# Patient Record
Sex: FEMALE | Race: BLACK OR AFRICAN AMERICAN | Employment: OTHER | URBAN - METROPOLITAN AREA
[De-identification: names, ages, dates, MRNs, and addresses within clinical notes are randomized per-mention and may not be internally consistent; named-entity substitution may affect disease eponyms.]

---

## 2017-02-20 ENCOUNTER — HOSPITAL ENCOUNTER (OUTPATIENT)
Dept: OTHER | Age: 67
Discharge: HOME | End: 2017-02-20
Payer: MEDICARE

## 2017-02-20 ENCOUNTER — HOSPITAL ENCOUNTER (OUTPATIENT)
Dept: LAB | Age: 67
Discharge: HOME | End: 2017-02-20
Payer: MEDICARE

## 2017-02-20 ENCOUNTER — HOSPITAL ENCOUNTER (OUTPATIENT)
Dept: PREADMISSION TESTING | Age: 67
Discharge: HOME | End: 2017-02-20
Payer: MEDICARE

## 2017-02-20 DIAGNOSIS — Z01.818 PREOP EXAMINATION: ICD-10-CM

## 2017-02-20 LAB
ABO + RH BLD: NORMAL
ANION GAP BLD CALC-SCNC: 10 MMOL/L (ref 3–18)
ATRIAL RATE: 59 BPM
BLOOD BANK CMNT PATIENT-IMP: NORMAL
BLOOD GROUP ANTIBODIES SERPL: NORMAL
BUN SERPL-MCNC: 15 MG/DL (ref 7–18)
BUN/CREAT SERPL: 17 (ref 12–20)
CALCIUM SERPL-MCNC: 8.7 MG/DL (ref 8.5–10.1)
CALCULATED P AXIS, ECG09: 59 DEGREES
CALCULATED R AXIS, ECG10: 62 DEGREES
CALCULATED T AXIS, ECG11: 56 DEGREES
CHLORIDE SERPL-SCNC: 101 MMOL/L (ref 100–108)
CO2 SERPL-SCNC: 28 MMOL/L (ref 21–32)
CREAT SERPL-MCNC: 0.88 MG/DL (ref 0.6–1.3)
DIAGNOSIS, 93000: NORMAL
ERYTHROCYTE [DISTWIDTH] IN BLOOD BY AUTOMATED COUNT: 14.4 % (ref 11.6–14.5)
GLUCOSE SERPL-MCNC: 118 MG/DL (ref 74–99)
HCG SERPL QL: NEGATIVE
HCT VFR BLD AUTO: 38 % (ref 35–45)
HGB BLD-MCNC: 12.4 G/DL (ref 12–16)
MCH RBC QN AUTO: 28.9 PG (ref 24–34)
MCHC RBC AUTO-ENTMCNC: 32.6 G/DL (ref 31–37)
MCV RBC AUTO: 88.6 FL (ref 74–97)
P-R INTERVAL, ECG05: 164 MS
PLATELET # BLD AUTO: 294 K/UL (ref 135–420)
PMV BLD AUTO: 9.9 FL (ref 9.2–11.8)
POTASSIUM SERPL-SCNC: 3.7 MMOL/L (ref 3.5–5.5)
Q-T INTERVAL, ECG07: 396 MS
QRS DURATION, ECG06: 90 MS
QTC CALCULATION (BEZET), ECG08: 392 MS
RBC # BLD AUTO: 4.29 M/UL (ref 4.2–5.3)
SODIUM SERPL-SCNC: 139 MMOL/L (ref 136–145)
SPECIMEN EXP DATE BLD: NORMAL
VENTRICULAR RATE, ECG03: 59 BPM
WBC # BLD AUTO: 7 K/UL (ref 4.6–13.2)

## 2017-02-20 PROCEDURE — 36415 COLL VENOUS BLD VENIPUNCTURE: CPT | Performed by: OBSTETRICS & GYNECOLOGY

## 2017-02-20 PROCEDURE — 86900 BLOOD TYPING SEROLOGIC ABO: CPT | Performed by: OBSTETRICS & GYNECOLOGY

## 2017-02-20 PROCEDURE — 84703 CHORIONIC GONADOTROPIN ASSAY: CPT | Performed by: OBSTETRICS & GYNECOLOGY

## 2017-02-20 PROCEDURE — 71020 XR CHEST PA LAT: CPT

## 2017-02-20 PROCEDURE — 80048 BASIC METABOLIC PNL TOTAL CA: CPT | Performed by: OBSTETRICS & GYNECOLOGY

## 2017-02-20 PROCEDURE — 93005 ELECTROCARDIOGRAM TRACING: CPT

## 2017-02-20 PROCEDURE — 86304 IMMUNOASSAY TUMOR CA 125: CPT | Performed by: OBSTETRICS & GYNECOLOGY

## 2017-02-20 PROCEDURE — 85027 COMPLETE CBC AUTOMATED: CPT | Performed by: OBSTETRICS & GYNECOLOGY

## 2017-02-20 RX ORDER — METFORMIN HYDROCHLORIDE 500 MG/1
500 TABLET ORAL 2 TIMES DAILY WITH MEALS
COMMUNITY

## 2017-02-20 RX ORDER — METFORMIN HYDROCHLORIDE 500 MG/1
1500 TABLET ORAL
COMMUNITY

## 2017-02-20 NOTE — PERIOP NOTES
PAT - SURGICAL PRE-ADMISSION INSTRUCTIONS    NAME:  Jose Alberto Hendrix                                                          TODAY'S DATE:  2/20/2017    SURGERY DATE:  2/28/2017                                  SURGERY ARRIVAL TIME:  0830    1. Do NOT eat or drink anything, including candy or gum, after MIDNIGHT on [id number] , unless you have specific instructions from your Surgeon or Anesthesia Provider to do so. 2. No smoking on the day of surgery. 3. No alcohol 24 hours prior to the day of surgery. 4. No recreational drugs for one week prior to the day of surgery. 5. Leave all valuables, including money/purse, at home. 6. Remove all jewelry, nail polish, makeup (including mascara); no lotions, powders, deodorant, or perfume/cologne/after shave. 7. Glasses/Contact lenses and Dentures may be worn to the hospital.  They will be removed prior to surgery. 8. Call your doctor if symptoms of a cold or illness develop within 24 ours prior to surgery. 9. AN ADULT MUST DRIVE YOU HOME AFTER OUTPATIENT SURGERY. 10. If you are having an OUTPATIENT procedure, please make arrangements for a responsible adult to be with you for 24 hours after your surgery. 11. If you are admitted to the hospital, you will be assigned to a bed after surgery is complete. Normally a family member will not be able to see you until you are in your assigned bed. 15. Family is encouraged to accompany you to the hospital.  We ask visitors in the treatment area to be limited to ONE person at a time to ensure patient privacy. EXCEPTIONS WILL BE MADE AS NEEDED. 15. Children under 12 are discouraged from entering the treatment area and need to be supervised by an adult when in the waiting room. Special Instructions:    Bring list of CURRENT medications . , Take these medications the morning of surgery with a sip of water:   BLOOD PRESSURE/ HEART MEDS, HOLD oral diabetic medication on the MORNING OF surgery. , Complete bowel prep per MD instructions., STOP anticoagulants AT LEAST 1 WEEK PRIOR to your surgery or, follow other MD instructions:  BLOOD THINNERS / NSAIDS / ASPIRIN    Patient Prep:    use CHG solution    These surgical instructions were reviewed with PATIENT during the VISIT (visit/phone call). A printed copy of the instructions was provided to PATIENT. Directions: On the morning of surgery, please go to the [address] [place] [address]. Enter the building from the [facility] entrance, 1st floor (next to the Emergency Room entrance). Take the elevator to the 2nd floor. Sign in at the Registration Desk.     If you have any questions and/or concerns, please do not hesitate to call:  (Prior to the day of surgery)  [facility] unit:  [phone]  (Day of surgery)  [address] unit:  [phone]

## 2017-02-21 LAB — CANCER AG125 SERPL-ACNC: 5.2 U/ML (ref 0–38.1)

## 2017-02-23 NOTE — H&P
Dear Dr. Delaney Proffer:    Your patient, Armand Sarabia, was seen at your request in gynecologic oncology consultation today for evaluation and management of an adnexal mass. Chief Complaint:  Adnexal Mass    History of Present Illness:  Armand Sarabia presented to [facility] with c/o several episodes of vaginal spotting on 10/21/16. A pelvic US was performed and revealed an anteverted uterus that measures 6.8 cm. The endometrial stripe measures 1.2 cm. There is no fluid within the endometrial canal. The cervix contains several nabothian cysts. The right ovary measures 13.2 cm, no normal ovarian tissue is identified. A multiloculated cyst measures 12.5 cm. Several septations appear to have vascularity on color Doppler. The left ovary is not visualized. There is no fluid in the cul de sac. Today, the patient reports postmenopausal bleeding beginning in October. She has symptoms of pelvic pressure. Most recent Pap smear was negative. The patient presents to her visit today alone. Past Medical History:  Anxiety  Glaucoma  Hypertension - well controlled on medication  Diabetes  Cataracts  Surgeries:        Health Maintenance:  Never smoker  Mammogram - Screening (bilateral) on 2016  Pap Smear on   Pelvic Exam on 2017    Review of Systems:  Constitutional: No weight loss, no fever, no chills, no night sweats, energy level good. Cardiovascular:no chest pain, no palpitations, no syncope, no upper extremity edema, no lower extremity edema, no calf discomfort. Eyes: no diplopia, no transient or permanent loss of vision, no scotomata, cataracts. ENT/Mouth: no tinnitus, normal hearing, no epistaxis, no hoarseness, no oral ulcers, no gingival bleeding, no sore throat, no postnasal drip, no mouth pain, no sinus pain, no dysphagia. Respiratory:  no cough, no hemoptysis, no dyspnea, no pleurisy, no wheezing.   Breast:  no breast mass, no pain, no nipple discharge, no change in size, no change in shape. Gastrointestinal:  no abdominal pain, no nausea, no vomiting, no constipation, no hematochezia, no jaundice, no abdominal cramping, no hematemesis, no diarrhea, no melena, no dyspepsia, no dysphagia. Female urinary:  no dysuria, no hematuria, no nocturia, no urinary incontinence. Gynecological:  no vaginal discharge, no suprapubic pain, no abnormal vaginal bleeding. Musculoskeletal: no muscle pain, no swollen joints, no joint redness, no bone pain, no spine tenderness. Neurologic:  no confusion, no seizures, no syncope, no tremor, no speech change, no headache, no hiccups, no abnormal gait, no weakness, no sensory changes. Psychiatric:  no depression, no anxiety, concentration normal.  Endocrine:  no polyuria, no polydipsia, no thyroid disease symptoms, no hot flashes. Hematologic:  no epistaxis, no gingival bleeding, no petechiae, no ecchymosis. Lymphatic:  no lymphadenopathy, no lymphedema. Ob/Gyn History:  Menses Details: Last Period: 2007; ; Pregnancy Details: Para: 3; #Living Children: 3; ; Menopause Information: Postmenopausal; ; OB/GYN Medication Hx: IUD: No; Other Contraceptive Hx: No;    Family History:Father: Unknown History; Mother: Unknown History    Social History:  Marital Status:    Employment Status:     Vital Signs:  Vital signs:  HT: 64 in, WT: 165.5 lb,  Blood pressure: 130/70, Pulse: 78, Temperature: 98 F, Respirations: 16, O2 sat: 95%, Pain Scale: 0, Height: 64 in, Weight: 165.5 lb, BSA: 1.84, BMI: 28.41 kg/m2, BMI: 28.41 kg/m2    Physical Exam:  Constitutional:  normal appearance, no acute distress. Eyes:  conjunctivae normal, eyelids normal, PERRLA, anicteric. Ears: nose, throat:  external ears and nose normal, hearing grossly normal, oropharynx unremarkable. Neck:  supple, no masses. Respiratory:  breathing comfortably, lungs clear to auscultation and percussion. Cardiovascular:  normal heart sounds, heart rhythm regular, no peripheral edema.   Left breast:  normal appearance, no breast mass, no tenderness. Right breast:  normal appearance, no breast mass, no tenderness. Abdomen: Suprapubic transverse scar; no ascites, no masses, no tenderness, no hepatomegaly, no splenomegaly. Pelvic: See procedure note. external genitalia unremarkable. Bimanual exam reveals a 10-12cm mass in the right pelvis; reasonable mobility of the uterus. No nodularity. Accompanied by:  Female staff member. Rectal:  Exam is limited to patient cooperation. Lymph nodes:  no cervical adenopathy, no axillary adenopathy, no supraclavicular adenopathy. Musculoskeletal:  normal muscle strength. Neurologic:  no focal motor deficit, normal gait, no abnormal mental status. Psychiatric:  oriented to person, time and place, mood and affect appropriate to situation, appropriate judgement and insight, memory intact. In-House Procedure:  Endometrial biopsy w/wo endocervical biopsy; w/wo cervical dilation, any method (separate procedure)  52578    Uterus sounds to 7cm; adequate sample obtained. Laboratory:    Current Medications:  Amlodipine Oral  Glimepiride Oral  Lisinopril Oral  Metformin Oral  Metoprolol Oral (Tartrate)  Simvastatin Oral      Allergies & Adverse Reactions:  No known medication allergies    Problem List:    Assessment & Plan:  Postmenopausal bleeding  13cm complex right ovarian mass    I discussed the current situation in detail with Ms. Jayro Haines. Endometrial biopsy was performed today. CT of the abdomen and pelvis will be scheduled and the patient will contact the office for all results. I recommended that we make plans to go to the operating room for minimally invasive robotic TLH-BSO, possible staging of either right ovarian malignancy or endometrial malignancy. The details, risks, and postoperative recovery were reviewed as well as the possible need for additional intervention depending upon the final pathology report.   CA-125 will be included with her pre-op labs. Her questions were answered to her apparent satisfaction and we will proceed on 2/28/17 at \[facility]". Thank you for the opportunity to share in the care of this pleasant patient. Sincerely,    New Orders:  01/16/2017, RTC MD, Perform Date: Prior to Next Visit, Instructions: Post op 2 weeks after 2/28/17 surgery  01/16/2017, CT abdomen/pelvis w/ contrast, Perform Date: STAT, Instructions: DX: Adnexal mass R19.09 / optima Joycelyn Maria Luisa [id number]*IZX exp 4/19/2017 / order faxed to [facility] @ [facility] to call patient and schedule for ASAP at [facility] /     Other Physicians:  Kt Banerjee [facility])    Adrian Martines M.D. Send copy of note to:  Dillon Steve MD (Referring)  Kt Banerjee    Documentation provided by Sergio King, for Dr. Phillip Barbosa, 01/16/17.     Electronically signed by Adrian Martines MD 01/30/2017 16:16 EST

## 2017-02-27 ENCOUNTER — ANESTHESIA EVENT (OUTPATIENT)
Dept: SURGERY | Age: 67
End: 2017-02-27
Payer: MEDICARE

## 2017-02-28 ENCOUNTER — HOSPITAL ENCOUNTER (OUTPATIENT)
Age: 67
Discharge: HOME | End: 2017-03-01
Attending: OBSTETRICS & GYNECOLOGY | Admitting: OBSTETRICS & GYNECOLOGY
Payer: MEDICARE

## 2017-02-28 ENCOUNTER — ANESTHESIA (OUTPATIENT)
Dept: SURGERY | Age: 67
End: 2017-02-28
Payer: MEDICARE

## 2017-02-28 PROBLEM — N83.209 OVARIAN CYST: Status: ACTIVE | Noted: 2017-02-28

## 2017-02-28 LAB
BASOPHILS # BLD AUTO: 0 K/UL (ref 0–0.06)
BASOPHILS # BLD: 0 % (ref 0–2)
DIFFERENTIAL METHOD BLD: ABNORMAL
EOSINOPHIL # BLD: 0 K/UL (ref 0–0.4)
EOSINOPHIL NFR BLD: 0 % (ref 0–5)
ERYTHROCYTE [DISTWIDTH] IN BLOOD BY AUTOMATED COUNT: 13.7 % (ref 11.6–14.5)
GLUCOSE BLD STRIP.AUTO-MCNC: 137 MG/DL (ref 70–110)
GLUCOSE BLD STRIP.AUTO-MCNC: 149 MG/DL (ref 70–110)
GLUCOSE BLD STRIP.AUTO-MCNC: 151 MG/DL (ref 70–110)
HCT VFR BLD AUTO: 32.8 % (ref 35–45)
HGB BLD-MCNC: 10.7 G/DL (ref 12–16)
LYMPHOCYTES # BLD AUTO: 17 % (ref 21–52)
LYMPHOCYTES # BLD: 1 K/UL (ref 0.9–3.6)
MCH RBC QN AUTO: 28.4 PG (ref 24–34)
MCHC RBC AUTO-ENTMCNC: 32.6 G/DL (ref 31–37)
MCV RBC AUTO: 87 FL (ref 74–97)
MONOCYTES # BLD: 0.4 K/UL (ref 0.05–1.2)
MONOCYTES NFR BLD AUTO: 7 % (ref 3–10)
NEUTS SEG # BLD: 4.6 K/UL (ref 1.8–8)
NEUTS SEG NFR BLD AUTO: 76 % (ref 40–73)
PLATELET # BLD AUTO: 176 K/UL (ref 135–420)
PMV BLD AUTO: 9.6 FL (ref 9.2–11.8)
RBC # BLD AUTO: 3.77 M/UL (ref 4.2–5.3)
WBC # BLD AUTO: 6 K/UL (ref 4.6–13.2)

## 2017-02-28 PROCEDURE — 74011250636 HC RX REV CODE- 250/636: Performed by: NURSE ANESTHETIST, CERTIFIED REGISTERED

## 2017-02-28 PROCEDURE — 74011250636 HC RX REV CODE- 250/636: Performed by: OBSTETRICS & GYNECOLOGY

## 2017-02-28 PROCEDURE — 76010000876 HC OR TIME 2 TO 2.5HR INTENSV - TIER 2: Performed by: OBSTETRICS & GYNECOLOGY

## 2017-02-28 PROCEDURE — 77030011894 HC TY FOL URIMTR BARD -B: Performed by: OBSTETRICS & GYNECOLOGY

## 2017-02-28 PROCEDURE — 96374 THER/PROPH/DIAG INJ IV PUSH: CPT

## 2017-02-28 PROCEDURE — 76060000035 HC ANESTHESIA 2 TO 2.5 HR: Performed by: OBSTETRICS & GYNECOLOGY

## 2017-02-28 PROCEDURE — 77030031139 HC SUT VCRL2 J&J -A: Performed by: OBSTETRICS & GYNECOLOGY

## 2017-02-28 PROCEDURE — 77030008683 HC TU ET CUF COVD -A: Performed by: NURSE ANESTHETIST, CERTIFIED REGISTERED

## 2017-02-28 PROCEDURE — 77030034850: Performed by: OBSTETRICS & GYNECOLOGY

## 2017-02-28 PROCEDURE — 85025 COMPLETE CBC W/AUTO DIFF WBC: CPT | Performed by: OBSTETRICS & GYNECOLOGY

## 2017-02-28 PROCEDURE — 99218 HC RM OBSERVATION: CPT

## 2017-02-28 PROCEDURE — 88332 PATH CONSLTJ SURG EA ADD BLK: CPT | Performed by: OBSTETRICS & GYNECOLOGY

## 2017-02-28 PROCEDURE — 74011000250 HC RX REV CODE- 250: Performed by: OBSTETRICS & GYNECOLOGY

## 2017-02-28 PROCEDURE — 74011250636 HC RX REV CODE- 250/636

## 2017-02-28 PROCEDURE — 77030002933 HC SUT MCRYL J&J -A: Performed by: OBSTETRICS & GYNECOLOGY

## 2017-02-28 PROCEDURE — 96366 THER/PROPH/DIAG IV INF ADDON: CPT

## 2017-02-28 PROCEDURE — 74011250637 HC RX REV CODE- 250/637: Performed by: NURSE ANESTHETIST, CERTIFIED REGISTERED

## 2017-02-28 PROCEDURE — 77030013079 HC BLNKT BAIR HGGR 3M -A: Performed by: NURSE ANESTHETIST, CERTIFIED REGISTERED

## 2017-02-28 PROCEDURE — 77030011640 HC PAD GRND REM COVD -A: Performed by: OBSTETRICS & GYNECOLOGY

## 2017-02-28 PROCEDURE — 77030035043 HC TRCR ENDOSC OPTCL BLDLSS COVD -B: Performed by: OBSTETRICS & GYNECOLOGY

## 2017-02-28 PROCEDURE — 77030008518 HC TBNG INSUF ENDO STRY -B: Performed by: OBSTETRICS & GYNECOLOGY

## 2017-02-28 PROCEDURE — 74011000250 HC RX REV CODE- 250

## 2017-02-28 PROCEDURE — 77030022704 HC SUT VLOC COVD -B: Performed by: OBSTETRICS & GYNECOLOGY

## 2017-02-28 PROCEDURE — 77030010939 HC CLP LIG TELE -B: Performed by: OBSTETRICS & GYNECOLOGY

## 2017-02-28 PROCEDURE — 76210000006 HC OR PH I REC 0.5 TO 1 HR: Performed by: OBSTETRICS & GYNECOLOGY

## 2017-02-28 PROCEDURE — 88331 PATH CONSLTJ SURG 1 BLK 1SPC: CPT | Performed by: OBSTETRICS & GYNECOLOGY

## 2017-02-28 PROCEDURE — 77030032490 HC SLV COMPR SCD KNE COVD -B: Performed by: OBSTETRICS & GYNECOLOGY

## 2017-02-28 PROCEDURE — 77030018842 HC SOL IRR SOD CL 9% BAXT -A: Performed by: OBSTETRICS & GYNECOLOGY

## 2017-02-28 PROCEDURE — 77030027138 HC INCENT SPIROMETER -A

## 2017-02-28 PROCEDURE — C1727 CATH, BAL TIS DIS, NON-VAS: HCPCS | Performed by: OBSTETRICS & GYNECOLOGY

## 2017-02-28 PROCEDURE — 77030018778 HC MANIP UTER VCAR CNMD -B: Performed by: OBSTETRICS & GYNECOLOGY

## 2017-02-28 PROCEDURE — 82962 GLUCOSE BLOOD TEST: CPT

## 2017-02-28 PROCEDURE — 88305 TISSUE EXAM BY PATHOLOGIST: CPT | Performed by: OBSTETRICS & GYNECOLOGY

## 2017-02-28 PROCEDURE — 74011636637 HC RX REV CODE- 636/637: Performed by: NURSE ANESTHETIST, CERTIFIED REGISTERED

## 2017-02-28 PROCEDURE — 77030008477 HC STYL SATN SLP COVD -A: Performed by: NURSE ANESTHETIST, CERTIFIED REGISTERED

## 2017-02-28 PROCEDURE — 36415 COLL VENOUS BLD VENIPUNCTURE: CPT | Performed by: OBSTETRICS & GYNECOLOGY

## 2017-02-28 PROCEDURE — 77030008771 HC TU NG SALEM SUMP -A: Performed by: NURSE ANESTHETIST, CERTIFIED REGISTERED

## 2017-02-28 PROCEDURE — 77030016151 HC PROTCTR LNS DFOG COVD -B: Performed by: OBSTETRICS & GYNECOLOGY

## 2017-02-28 PROCEDURE — 77030035488 HC SEAL UNIV DISP INTU -C: Performed by: OBSTETRICS & GYNECOLOGY

## 2017-02-28 PROCEDURE — 77030035277 HC OBTRTR BLDELSS DISP INTU -B: Performed by: OBSTETRICS & GYNECOLOGY

## 2017-02-28 PROCEDURE — 77030002986 HC SUT PROL J&J -A: Performed by: OBSTETRICS & GYNECOLOGY

## 2017-02-28 PROCEDURE — A9270 NON-COVERED ITEM OR SERVICE: HCPCS | Performed by: NURSE ANESTHETIST, CERTIFIED REGISTERED

## 2017-02-28 PROCEDURE — 77030019908 HC STETH ESOPH SIMS -A: Performed by: NURSE ANESTHETIST, CERTIFIED REGISTERED

## 2017-02-28 PROCEDURE — 74011250637 HC RX REV CODE- 250/637: Performed by: OBSTETRICS & GYNECOLOGY

## 2017-02-28 PROCEDURE — 77030035051: Performed by: OBSTETRICS & GYNECOLOGY

## 2017-02-28 PROCEDURE — 88307 TISSUE EXAM BY PATHOLOGIST: CPT | Performed by: OBSTETRICS & GYNECOLOGY

## 2017-02-28 PROCEDURE — 77030010507 HC ADH SKN DERMBND J&J -B: Performed by: OBSTETRICS & GYNECOLOGY

## 2017-02-28 PROCEDURE — 77030010935 HC CLP LIG ABSRB TELE -B: Performed by: OBSTETRICS & GYNECOLOGY

## 2017-02-28 PROCEDURE — 77030020059 HC SYR ANGI CNTRL MRTM -A: Performed by: OBSTETRICS & GYNECOLOGY

## 2017-02-28 RX ORDER — NEOSTIGMINE METHYLSULFATE 5 MG/5 ML
SYRINGE (ML) INTRAVENOUS AS NEEDED
Status: DISCONTINUED | OUTPATIENT
Start: 2017-02-28 | End: 2017-02-28 | Stop reason: HOSPADM

## 2017-02-28 RX ORDER — ENOXAPARIN SODIUM 100 MG/ML
40 INJECTION SUBCUTANEOUS EVERY 24 HOURS
Status: DISCONTINUED | OUTPATIENT
Start: 2017-02-28 | End: 2017-03-01 | Stop reason: HOSPADM

## 2017-02-28 RX ORDER — METOPROLOL TARTRATE 50 MG/1
TABLET ORAL 2 TIMES DAILY
COMMUNITY

## 2017-02-28 RX ORDER — DIPHENHYDRAMINE HCL 25 MG
25 CAPSULE ORAL
Status: DISCONTINUED | OUTPATIENT
Start: 2017-02-28 | End: 2017-03-01 | Stop reason: HOSPADM

## 2017-02-28 RX ORDER — DEXTROSE 50 % IN WATER (D50W) INTRAVENOUS SYRINGE
25-50 AS NEEDED
Status: DISCONTINUED | OUTPATIENT
Start: 2017-02-28 | End: 2017-03-01 | Stop reason: HOSPADM

## 2017-02-28 RX ORDER — ZOLPIDEM TARTRATE 5 MG/1
5 TABLET ORAL
Status: DISCONTINUED | OUTPATIENT
Start: 2017-02-28 | End: 2017-03-01 | Stop reason: HOSPADM

## 2017-02-28 RX ORDER — ROCURONIUM BROMIDE 10 MG/ML
INJECTION, SOLUTION INTRAVENOUS AS NEEDED
Status: DISCONTINUED | OUTPATIENT
Start: 2017-02-28 | End: 2017-02-28 | Stop reason: HOSPADM

## 2017-02-28 RX ORDER — NALOXONE HYDROCHLORIDE 0.4 MG/ML
0.4 INJECTION, SOLUTION INTRAMUSCULAR; INTRAVENOUS; SUBCUTANEOUS AS NEEDED
Status: DISCONTINUED | OUTPATIENT
Start: 2017-02-28 | End: 2017-03-01 | Stop reason: HOSPADM

## 2017-02-28 RX ORDER — SIMVASTATIN 40 MG/1
40 TABLET, FILM COATED ORAL
COMMUNITY

## 2017-02-28 RX ORDER — GLYCOPYRROLATE 0.2 MG/ML
INJECTION INTRAMUSCULAR; INTRAVENOUS AS NEEDED
Status: DISCONTINUED | OUTPATIENT
Start: 2017-02-28 | End: 2017-02-28 | Stop reason: HOSPADM

## 2017-02-28 RX ORDER — LISINOPRIL 20 MG/1
20 TABLET ORAL DAILY
Status: DISCONTINUED | OUTPATIENT
Start: 2017-03-01 | End: 2017-03-01 | Stop reason: HOSPADM

## 2017-02-28 RX ORDER — BISACODYL 5 MG
5 TABLET, DELAYED RELEASE (ENTERIC COATED) ORAL DAILY PRN
Status: DISCONTINUED | OUTPATIENT
Start: 2017-02-28 | End: 2017-03-01 | Stop reason: HOSPADM

## 2017-02-28 RX ORDER — METOPROLOL TARTRATE 50 MG/1
50 TABLET ORAL 2 TIMES DAILY
Status: DISCONTINUED | OUTPATIENT
Start: 2017-02-28 | End: 2017-03-01 | Stop reason: HOSPADM

## 2017-02-28 RX ORDER — HYDROMORPHONE HYDROCHLORIDE 1 MG/ML
1-2 INJECTION, SOLUTION INTRAMUSCULAR; INTRAVENOUS; SUBCUTANEOUS
Status: CANCELLED | OUTPATIENT
Start: 2017-02-28

## 2017-02-28 RX ORDER — GLIMEPIRIDE 1 MG/1
TABLET ORAL
COMMUNITY

## 2017-02-28 RX ORDER — INSULIN LISPRO 100 [IU]/ML
INJECTION, SOLUTION INTRAVENOUS; SUBCUTANEOUS ONCE
Status: DISCONTINUED | OUTPATIENT
Start: 2017-02-28 | End: 2017-02-28 | Stop reason: HOSPADM

## 2017-02-28 RX ORDER — GUAIFENESIN 100 MG/5ML
81 LIQUID (ML) ORAL DAILY
COMMUNITY

## 2017-02-28 RX ORDER — ACETAMINOPHEN 325 MG/1
650 TABLET ORAL
Status: DISCONTINUED | OUTPATIENT
Start: 2017-02-28 | End: 2017-03-01 | Stop reason: HOSPADM

## 2017-02-28 RX ORDER — ONDANSETRON 2 MG/ML
4 INJECTION INTRAMUSCULAR; INTRAVENOUS
Status: CANCELLED | OUTPATIENT
Start: 2017-02-28

## 2017-02-28 RX ORDER — HYDROMORPHONE HYDROCHLORIDE 2 MG/ML
0.5 INJECTION, SOLUTION INTRAMUSCULAR; INTRAVENOUS; SUBCUTANEOUS
Status: DISCONTINUED | OUTPATIENT
Start: 2017-02-28 | End: 2017-03-01 | Stop reason: HOSPADM

## 2017-02-28 RX ORDER — AMLODIPINE BESYLATE 5 MG/1
5 TABLET ORAL DAILY
COMMUNITY

## 2017-02-28 RX ORDER — INSULIN LISPRO 100 [IU]/ML
INJECTION, SOLUTION INTRAVENOUS; SUBCUTANEOUS ONCE
Status: COMPLETED | OUTPATIENT
Start: 2017-02-28 | End: 2017-02-28

## 2017-02-28 RX ORDER — OXYCODONE AND ACETAMINOPHEN 5; 325 MG/1; MG/1
1-2 TABLET ORAL
Status: CANCELLED | OUTPATIENT
Start: 2017-02-28

## 2017-02-28 RX ORDER — FAMOTIDINE 20 MG/1
20 TABLET, FILM COATED ORAL ONCE
Status: COMPLETED | OUTPATIENT
Start: 2017-02-28 | End: 2017-02-28

## 2017-02-28 RX ORDER — LIDOCAINE HYDROCHLORIDE 20 MG/ML
INJECTION, SOLUTION EPIDURAL; INFILTRATION; INTRACAUDAL; PERINEURAL AS NEEDED
Status: DISCONTINUED | OUTPATIENT
Start: 2017-02-28 | End: 2017-02-28 | Stop reason: HOSPADM

## 2017-02-28 RX ORDER — SODIUM CHLORIDE 0.9 % (FLUSH) 0.9 %
5-10 SYRINGE (ML) INJECTION AS NEEDED
Status: DISCONTINUED | OUTPATIENT
Start: 2017-02-28 | End: 2017-03-01 | Stop reason: HOSPADM

## 2017-02-28 RX ORDER — FENTANYL CITRATE 50 UG/ML
INJECTION, SOLUTION INTRAMUSCULAR; INTRAVENOUS AS NEEDED
Status: DISCONTINUED | OUTPATIENT
Start: 2017-02-28 | End: 2017-02-28 | Stop reason: HOSPADM

## 2017-02-28 RX ORDER — ONDANSETRON 2 MG/ML
INJECTION INTRAMUSCULAR; INTRAVENOUS AS NEEDED
Status: DISCONTINUED | OUTPATIENT
Start: 2017-02-28 | End: 2017-02-28 | Stop reason: HOSPADM

## 2017-02-28 RX ORDER — OXYCODONE AND ACETAMINOPHEN 5; 325 MG/1; MG/1
2 TABLET ORAL
Status: DISCONTINUED | OUTPATIENT
Start: 2017-02-28 | End: 2017-03-01 | Stop reason: HOSPADM

## 2017-02-28 RX ORDER — ENOXAPARIN SODIUM 100 MG/ML
60 INJECTION SUBCUTANEOUS EVERY 24 HOURS
Status: CANCELLED | OUTPATIENT
Start: 2017-02-28

## 2017-02-28 RX ORDER — SODIUM CHLORIDE, SODIUM LACTATE, POTASSIUM CHLORIDE, CALCIUM CHLORIDE 600; 310; 30; 20 MG/100ML; MG/100ML; MG/100ML; MG/100ML
25 INJECTION, SOLUTION INTRAVENOUS CONTINUOUS
Status: DISCONTINUED | OUTPATIENT
Start: 2017-02-28 | End: 2017-02-28 | Stop reason: HOSPADM

## 2017-02-28 RX ORDER — LISINOPRIL AND HYDROCHLOROTHIAZIDE 12.5; 2 MG/1; MG/1
TABLET ORAL DAILY
COMMUNITY

## 2017-02-28 RX ORDER — FENTANYL CITRATE 50 UG/ML
50 INJECTION, SOLUTION INTRAMUSCULAR; INTRAVENOUS AS NEEDED
Status: DISCONTINUED | OUTPATIENT
Start: 2017-02-28 | End: 2017-03-01 | Stop reason: HOSPADM

## 2017-02-28 RX ORDER — CEFOTETAN AND DEXTROSE 2 G/50ML
2 INJECTION, SOLUTION INTRAVENOUS ONCE
Status: COMPLETED | OUTPATIENT
Start: 2017-02-28 | End: 2017-02-28

## 2017-02-28 RX ORDER — SODIUM CHLORIDE, SODIUM LACTATE, POTASSIUM CHLORIDE, CALCIUM CHLORIDE 600; 310; 30; 20 MG/100ML; MG/100ML; MG/100ML; MG/100ML
125 INJECTION, SOLUTION INTRAVENOUS CONTINUOUS
Status: DISCONTINUED | OUTPATIENT
Start: 2017-02-28 | End: 2017-03-01 | Stop reason: HOSPADM

## 2017-02-28 RX ORDER — OXYCODONE AND ACETAMINOPHEN 5; 325 MG/1; MG/1
1 TABLET ORAL
Qty: 30 TAB | Refills: 0 | Status: SHIPPED | OUTPATIENT
Start: 2017-02-28

## 2017-02-28 RX ORDER — ONDANSETRON 2 MG/ML
4 INJECTION INTRAMUSCULAR; INTRAVENOUS
Status: DISCONTINUED | OUTPATIENT
Start: 2017-02-28 | End: 2017-03-01 | Stop reason: HOSPADM

## 2017-02-28 RX ORDER — SODIUM CHLORIDE 0.9 % (FLUSH) 0.9 %
5-10 SYRINGE (ML) INJECTION EVERY 8 HOURS
Status: DISCONTINUED | OUTPATIENT
Start: 2017-02-28 | End: 2017-03-01 | Stop reason: HOSPADM

## 2017-02-28 RX ORDER — METFORMIN HYDROCHLORIDE 500 MG/1
500 TABLET ORAL 2 TIMES DAILY WITH MEALS
Status: DISCONTINUED | OUTPATIENT
Start: 2017-02-28 | End: 2017-03-01 | Stop reason: HOSPADM

## 2017-02-28 RX ORDER — ZOLPIDEM TARTRATE 5 MG/1
5 TABLET ORAL
Status: DISCONTINUED | OUTPATIENT
Start: 2017-02-28 | End: 2017-02-28 | Stop reason: SDUPTHER

## 2017-02-28 RX ORDER — POTASSIUM CHLORIDE AND SODIUM CHLORIDE 450; 150 MG/100ML; MG/100ML
INJECTION, SOLUTION INTRAVENOUS CONTINUOUS
Status: DISCONTINUED | OUTPATIENT
Start: 2017-02-28 | End: 2017-03-01 | Stop reason: HOSPADM

## 2017-02-28 RX ORDER — GLIMEPIRIDE 2 MG/1
1 TABLET ORAL
Status: DISCONTINUED | OUTPATIENT
Start: 2017-03-01 | End: 2017-03-01 | Stop reason: HOSPADM

## 2017-02-28 RX ORDER — AMLODIPINE BESYLATE 2.5 MG/1
5 TABLET ORAL DAILY
Status: DISCONTINUED | OUTPATIENT
Start: 2017-03-01 | End: 2017-03-01 | Stop reason: HOSPADM

## 2017-02-28 RX ORDER — HYDROMORPHONE HYDROCHLORIDE 1 MG/ML
INJECTION, SOLUTION INTRAMUSCULAR; INTRAVENOUS; SUBCUTANEOUS AS NEEDED
Status: DISCONTINUED | OUTPATIENT
Start: 2017-02-28 | End: 2017-02-28 | Stop reason: HOSPADM

## 2017-02-28 RX ORDER — ESMOLOL HYDROCHLORIDE 10 MG/ML
INJECTION INTRAVENOUS AS NEEDED
Status: DISCONTINUED | OUTPATIENT
Start: 2017-02-28 | End: 2017-02-28 | Stop reason: HOSPADM

## 2017-02-28 RX ORDER — HYDROMORPHONE HYDROCHLORIDE 1 MG/ML
1 INJECTION, SOLUTION INTRAMUSCULAR; INTRAVENOUS; SUBCUTANEOUS
Status: DISCONTINUED | OUTPATIENT
Start: 2017-02-28 | End: 2017-03-01 | Stop reason: HOSPADM

## 2017-02-28 RX ORDER — MIDAZOLAM HYDROCHLORIDE 1 MG/ML
INJECTION, SOLUTION INTRAMUSCULAR; INTRAVENOUS AS NEEDED
Status: DISCONTINUED | OUTPATIENT
Start: 2017-02-28 | End: 2017-02-28 | Stop reason: HOSPADM

## 2017-02-28 RX ORDER — BUPIVACAINE HYDROCHLORIDE 5 MG/ML
INJECTION, SOLUTION EPIDURAL; INTRACAUDAL AS NEEDED
Status: DISCONTINUED | OUTPATIENT
Start: 2017-02-28 | End: 2017-02-28 | Stop reason: HOSPADM

## 2017-02-28 RX ORDER — MAGNESIUM SULFATE 100 %
4 CRYSTALS MISCELLANEOUS AS NEEDED
Status: DISCONTINUED | OUTPATIENT
Start: 2017-02-28 | End: 2017-03-01 | Stop reason: HOSPADM

## 2017-02-28 RX ORDER — NALOXONE HYDROCHLORIDE 0.4 MG/ML
0.4 INJECTION, SOLUTION INTRAMUSCULAR; INTRAVENOUS; SUBCUTANEOUS AS NEEDED
Status: DISCONTINUED | OUTPATIENT
Start: 2017-02-28 | End: 2017-02-28 | Stop reason: SDUPTHER

## 2017-02-28 RX ORDER — HYDROCHLOROTHIAZIDE 12.5 MG/1
12.5 CAPSULE ORAL DAILY
Status: DISCONTINUED | OUTPATIENT
Start: 2017-03-01 | End: 2017-03-01 | Stop reason: HOSPADM

## 2017-02-28 RX ORDER — DIPHENHYDRAMINE HYDROCHLORIDE 50 MG/ML
25 INJECTION, SOLUTION INTRAMUSCULAR; INTRAVENOUS
Status: DISCONTINUED | OUTPATIENT
Start: 2017-02-28 | End: 2017-03-01 | Stop reason: HOSPADM

## 2017-02-28 RX ORDER — PROPOFOL 10 MG/ML
INJECTION, EMULSION INTRAVENOUS AS NEEDED
Status: DISCONTINUED | OUTPATIENT
Start: 2017-02-28 | End: 2017-02-28 | Stop reason: HOSPADM

## 2017-02-28 RX ORDER — INSULIN LISPRO 100 [IU]/ML
INJECTION, SOLUTION INTRAVENOUS; SUBCUTANEOUS
Status: DISCONTINUED | OUTPATIENT
Start: 2017-02-28 | End: 2017-03-01 | Stop reason: HOSPADM

## 2017-02-28 RX ORDER — FAMOTIDINE 10 MG/ML
20 INJECTION INTRAVENOUS EVERY 12 HOURS
Status: DISCONTINUED | OUTPATIENT
Start: 2017-02-28 | End: 2017-03-01 | Stop reason: HOSPADM

## 2017-02-28 RX ADMIN — OXYCODONE HYDROCHLORIDE AND ACETAMINOPHEN 1 TABLET: 5; 325 TABLET ORAL at 21:23

## 2017-02-28 RX ADMIN — METFORMIN HYDROCHLORIDE 500 MG: 500 TABLET, FILM COATED ORAL at 17:36

## 2017-02-28 RX ADMIN — FENTANYL CITRATE 50 MCG: 50 INJECTION, SOLUTION INTRAMUSCULAR; INTRAVENOUS at 13:03

## 2017-02-28 RX ADMIN — FENTANYL CITRATE 50 MCG: 50 INJECTION, SOLUTION INTRAMUSCULAR; INTRAVENOUS at 11:43

## 2017-02-28 RX ADMIN — ESMOLOL HYDROCHLORIDE 10 MG: 10 INJECTION INTRAVENOUS at 11:43

## 2017-02-28 RX ADMIN — FAMOTIDINE 20 MG: 10 INJECTION, SOLUTION INTRAVENOUS at 21:24

## 2017-02-28 RX ADMIN — SODIUM CHLORIDE, SODIUM LACTATE, POTASSIUM CHLORIDE, AND CALCIUM CHLORIDE: 600; 310; 30; 20 INJECTION, SOLUTION INTRAVENOUS at 13:03

## 2017-02-28 RX ADMIN — Medication 10 ML: at 21:24

## 2017-02-28 RX ADMIN — LIDOCAINE HYDROCHLORIDE 50 MG: 20 INJECTION, SOLUTION EPIDURAL; INFILTRATION; INTRACAUDAL; PERINEURAL at 11:20

## 2017-02-28 RX ADMIN — MIDAZOLAM HYDROCHLORIDE 2 MG: 1 INJECTION, SOLUTION INTRAMUSCULAR; INTRAVENOUS at 11:16

## 2017-02-28 RX ADMIN — FAMOTIDINE 20 MG: 10 INJECTION, SOLUTION INTRAVENOUS at 15:11

## 2017-02-28 RX ADMIN — PROPOFOL 150 MG: 10 INJECTION, EMULSION INTRAVENOUS at 11:20

## 2017-02-28 RX ADMIN — FENTANYL CITRATE 50 MCG: 50 INJECTION, SOLUTION INTRAMUSCULAR; INTRAVENOUS at 14:05

## 2017-02-28 RX ADMIN — SODIUM CHLORIDE AND POTASSIUM CHLORIDE: 4.5; 1.49 INJECTION, SOLUTION INTRAVENOUS at 16:21

## 2017-02-28 RX ADMIN — CEFOTETAN DISODIUM 2 G: 2 INJECTION, POWDER, FOR SOLUTION INTRAMUSCULAR; INTRAVENOUS at 11:16

## 2017-02-28 RX ADMIN — SODIUM CHLORIDE, SODIUM LACTATE, POTASSIUM CHLORIDE, AND CALCIUM CHLORIDE 25 ML/HR: 600; 310; 30; 20 INJECTION, SOLUTION INTRAVENOUS at 08:30

## 2017-02-28 RX ADMIN — ONDANSETRON 4 MG: 2 INJECTION INTRAMUSCULAR; INTRAVENOUS at 12:45

## 2017-02-28 RX ADMIN — ENOXAPARIN SODIUM 40 MG: 40 INJECTION SUBCUTANEOUS at 15:09

## 2017-02-28 RX ADMIN — GLYCOPYRROLATE 0.2 MG: 0.2 INJECTION INTRAMUSCULAR; INTRAVENOUS at 12:58

## 2017-02-28 RX ADMIN — Medication 2 MG: at 12:58

## 2017-02-28 RX ADMIN — FENTANYL CITRATE 100 MCG: 50 INJECTION, SOLUTION INTRAMUSCULAR; INTRAVENOUS at 11:20

## 2017-02-28 RX ADMIN — HYDROMORPHONE HYDROCHLORIDE 1 MG: 1 INJECTION, SOLUTION INTRAMUSCULAR; INTRAVENOUS; SUBCUTANEOUS at 11:37

## 2017-02-28 RX ADMIN — ROCURONIUM BROMIDE 30 MG: 10 INJECTION, SOLUTION INTRAVENOUS at 11:20

## 2017-02-28 RX ADMIN — INSULIN LISPRO 3 UNITS: 100 INJECTION, SOLUTION INTRAVENOUS; SUBCUTANEOUS at 14:02

## 2017-02-28 RX ADMIN — FENTANYL CITRATE 50 MCG: 50 INJECTION, SOLUTION INTRAMUSCULAR; INTRAVENOUS at 11:42

## 2017-02-28 RX ADMIN — FAMOTIDINE 20 MG: 20 TABLET ORAL at 08:34

## 2017-02-28 RX ADMIN — METOPROLOL TARTRATE 50 MG: 50 TABLET ORAL at 17:36

## 2017-02-28 NOTE — ANESTHESIA POSTPROCEDURE EVALUATION
Post-Anesthesia Evaluation and Assessment    Patient: Gabrielle Martinez MRN: [id number]  SSN: [id number]    YOB: 1950  Age: 73 y.o. Sex: female       Cardiovascular Function/Vital Signs  Visit Vitals    /66    Pulse 79    Temp 36.7 °C (98 °F)    Resp 23    Ht 5' 4\" (1.626 m)    Wt 73.5 kg (162 lb)    SpO2 100%    BMI 27.81 kg/m2       Patient is status post general anesthesia for Procedure(s):  davinci total laparoscopic HYSTERECTOMY, bilateral salpingo oophorectomy, posible staging XI ROBOTIC ASSISTED. Nausea/Vomiting: None    Postoperative hydration reviewed and adequate. Pain:  Pain Scale 1: Numeric (0 - 10) (02/28/17 1359)  Pain Intensity 1: 3 (02/28/17 1359)   Managed    Neurological Status:   Neuro (WDL): Within Defined Limits (02/28/17 1330)   At baseline    Mental Status and Level of Consciousness: Arousable    Pulmonary Status:   O2 Device: Oxygen mask (02/28/17 1344)   Adequate oxygenation and airway patent    Complications related to anesthesia: None    Post-anesthesia assessment completed.  No concerns    Signed By: Sheba Dasilva MD     February 28, 2017

## 2017-02-28 NOTE — ROUTINE PROCESS
Pt blood sugar 137. Held insulin injection, pt on clear liquid diet. Pt getting oral metphormin at 1700. Pt O2 at 98% on 3 liters oxygen nasal cannula. 1745 pt o2 100% on 3 liters oxygen nasal cannula. Rn helped repositition in bed, helped with clear liquid tray. Pt stated she was starting to feel pain, RN explained medications available to her, pt declined at this time, saying she thought she was just hungry and wanted to try her tray. RN instructed her to call for assistance if needed or for pain meds. Kumar catheter in place, SCDs on.

## 2017-02-28 NOTE — H&P (VIEW-ONLY)
Dear Dr. Per Trujillo:    Your patient, Liz Barraza, was seen at your request in gynecologic oncology consultation today for evaluation and management of an adnexal mass. Chief Complaint:  Adnexal Mass    History of Present Illness:  Liz Barraza presented to Dr. Healy at [facility] with c/o several episodes of vaginal spotting on 10/21/16. A pelvic US was performed and revealed an anteverted uterus that measures 6.8 cm. The endometrial stripe measures 1.2 cm. There is no fluid within the endometrial canal. The cervix contains several nabothian cysts. The right ovary measures 13.2 cm, no normal ovarian tissue is identified. A multiloculated cyst measures 12.5 cm. Several septations appear to have vascularity on color Doppler. The left ovary is not visualized. There is no fluid in the cul de sac. Today, the patient reports postmenopausal bleeding beginning in October. She has symptoms of pelvic pressure. Most recent Pap smear was negative. The patient presents to her visit today alone. Past Medical History:  Anxiety  Glaucoma  Hypertension - well controlled on medication  Diabetes  Cataracts  Surgeries:        Health Maintenance:  Never smoker  Mammogram - Screening (bilateral) on 2016  Pap Smear on   Pelvic Exam on 2017    Review of Systems:  Constitutional: No weight loss, no fever, no chills, no night sweats, energy level good. Cardiovascular:no chest pain, no palpitations, no syncope, no upper extremity edema, no lower extremity edema, no calf discomfort. Eyes: no diplopia, no transient or permanent loss of vision, no scotomata, cataracts. ENT/Mouth: no tinnitus, normal hearing, no epistaxis, no hoarseness, no oral ulcers, no gingival bleeding, no sore throat, no postnasal drip, no mouth pain, no sinus pain, no dysphagia. Respiratory:  no cough, no hemoptysis, no dyspnea, no pleurisy, no wheezing.   Breast:  no breast mass, no pain, no nipple discharge, no change in size, no change in shape. Gastrointestinal:  no abdominal pain, no nausea, no vomiting, no constipation, no hematochezia, no jaundice, no abdominal cramping, no hematemesis, no diarrhea, no melena, no dyspepsia, no dysphagia. Female urinary:  no dysuria, no hematuria, no nocturia, no urinary incontinence. Gynecological:  no vaginal discharge, no suprapubic pain, no abnormal vaginal bleeding. Musculoskeletal: no muscle pain, no swollen joints, no joint redness, no bone pain, no spine tenderness. Neurologic:  no confusion, no seizures, no syncope, no tremor, no speech change, no headache, no hiccups, no abnormal gait, no weakness, no sensory changes. Psychiatric:  no depression, no anxiety, concentration normal.  Endocrine:  no polyuria, no polydipsia, no thyroid disease symptoms, no hot flashes. Hematologic:  no epistaxis, no gingival bleeding, no petechiae, no ecchymosis. Lymphatic:  no lymphadenopathy, no lymphedema. Ob/Gyn History:  Menses Details: Last Period: 2007; ; Pregnancy Details: Para: 3; #Living Children: 3; ; Menopause Information: Postmenopausal; ; OB/GYN Medication Hx: IUD: No; Other Contraceptive Hx: No;    Family History:Father: Unknown History; Mother: Unknown History    Social History:  Marital Status:    Employment Status:     Vital Signs:  Vital signs:  HT: 64 in, WT: 165.5 lb,  Blood pressure: 130/70, Pulse: 78, Temperature: 98 F, Respirations: 16, O2 sat: 95%, Pain Scale: 0, Height: 64 in, Weight: 165.5 lb, BSA: 1.84, BMI: 28.41 kg/m2, BMI: 28.41 kg/m2    Physical Exam:  Constitutional:  normal appearance, no acute distress. Eyes:  conjunctivae normal, eyelids normal, PERRLA, anicteric. Ears: nose, throat:  external ears and nose normal, hearing grossly normal, oropharynx unremarkable. Neck:  supple, no masses. Respiratory:  breathing comfortably, lungs clear to auscultation and percussion. Cardiovascular:  normal heart sounds, heart rhythm regular, no peripheral edema.   Left breast:  normal appearance, no breast mass, no tenderness. Right breast:  normal appearance, no breast mass, no tenderness. Abdomen: Suprapubic transverse scar; no ascites, no masses, no tenderness, no hepatomegaly, no splenomegaly. Pelvic: See procedure note. external genitalia unremarkable. Bimanual exam reveals a 10-12cm mass in the right pelvis; reasonable mobility of the uterus. No nodularity. Accompanied by:  Female staff member. Rectal:  Exam is limited to patient cooperation. Lymph nodes:  no cervical adenopathy, no axillary adenopathy, no supraclavicular adenopathy. Musculoskeletal:  normal muscle strength. Neurologic:  no focal motor deficit, normal gait, no abnormal mental status. Psychiatric:  oriented to person, time and place, mood and affect appropriate to situation, appropriate judgement and insight, memory intact. In-House Procedure:  Endometrial biopsy w/wo endocervical biopsy; w/wo cervical dilation, any method (separate procedure)  67794    Uterus sounds to 7cm; adequate sample obtained. Laboratory:    Current Medications:  Amlodipine Oral  Glimepiride Oral  Lisinopril Oral  Metformin Oral  Metoprolol Oral (Tartrate)  Simvastatin Oral      Allergies & Adverse Reactions:  No known medication allergies    Problem List:    Assessment & Plan:  Postmenopausal bleeding  13cm complex right ovarian mass    I discussed the current situation in detail with Ms. Villatoro First. Endometrial biopsy was performed today. CT of the abdomen and pelvis will be scheduled and the patient will contact the office for all results. I recommended that we make plans to go to the operating room for minimally invasive robotic TLH-BSO, possible staging of either right ovarian malignancy or endometrial malignancy. The details, risks, and postoperative recovery were reviewed as well as the possible need for additional intervention depending upon the final pathology report.   CA-125 will be included with her pre-op labs. Her questions were answered to her apparent satisfaction and we will proceed on 2/28/17 at [facility]. Thank you for the opportunity to share in the care of this pleasant patient. Sincerely,    New Orders:  01/16/2017, RTC MD, Perform Date: Prior to Next Visit, Instructions: Post op 2 weeks after 2/28/17 surgery  01/16/2017, CT abdomen/pelvis w/ contrast, Perform Date: STAT, Instructions: DX: Adnexal mass R19.09 / optima Atul Meter [id number]*LYI exp 4/19/2017 / order faxed to [facility] to call patient and schedule for ASAP at [facility] /     Other Physicians:  Inocencio Dyson [facility])    Ameena Welsh M.D. Send copy of note to:  Juan Jose Heredia MD (Referring)  Inocencio Dyson    Documentation provided by Sergio Grewal, for Dr. Janice Baer, 01/16/17.     Electronically signed by Ameena Welsh MD 01/30/2017 16:16 EST

## 2017-02-28 NOTE — OP NOTES
Operative Report    Patient: Donato Dasilva MRN: [id number]  SSN: [id number]    YOB: 1950  Age: 73 y.o. Sex: female         DATE OF OPERATION: 2/28/2017      SURGEON: Aniya Means MD      ASSISTANT: URI Whitney, present and scrubbed to assist with port placement, robotic docking, and uterine manipulation   [place], [place]      PREOPERATIVE DIAGNOSIS:  pelvic mass postmenipausal bleeding      POSTOPERATIVE DIAGNOSIS: 18cm right ovarian mucinous cystadenoma. No evidence of malignancy in the endometrium  . OPERATION PERFORMED:  1. Robotic hysterectomy with bilateral salpingo-oophorectomy (BSO). Specimens:    ID Type Source Tests Collected by Time Destination   1 : UTERUS, CERVIX, BILATERAL TUBES AND OVARIES Frozen Section   Aniya Means MD 2/28/2017 1221 Pathology   2 : [facility] Preservative   Aniya Means MD 2/28/2017 1228 Pathology         INDICATIONS:  Donato Dasilva is a 73 y.o. old female admitted for surgery with a history of: There are no active problems to display for this patient. .  Recommendations were made for a definitive surgical therapy. The patient was advised of potential risks and complications and written  consent was obtained. ANESTHESIA: General.    COMPLICATIONS: none    EBL: minimal    FINDINGS: 18cm partially collapsed right ovarian cystic mass with spontaneous rupture. Frozen section as above. No other visible abnormality in the abdomen or pelvis. DESCRIPTION OF OPERATION:     TECHNIQUE: After the patient was identified in the operating room, she was   placed under general anesthesia by the anesthesia team. She was sterilely   prepped and draped in the modified lithotomy position. The cervix and   uterus were secured with a VCare. An incision was made in the midline of  the upper abdomen and an 8mm Optiview trocar was inserted. A   pneumoperitoneum was created.  Inspection revealed free fluid in the abdomen and a partially collapsed 18cm cystic mass replacing the right ovary. The 8 mm robotic ports were placed under   direct vision in the right and left upper abdomen, and an accessory port   was placed in the right lower quadrant. The patient was placed in Trendelenburg position and the robot was docked. The ureters were identified. The infundibulopelvic ligaments were skeletonized, coagulated, and divided. The broad ligaments were divided with   electrocautery. The round ligaments were coagulated and divided. The   bladder was sharply dissected from the cervix and upper vagina. The uterine   vessels were skeletonized, thoroughly coagulated, and divided. The cardinal   ligaments were divided with electrocautery and electrocautery was used to   circumscribe the colpotomizer cup. The specimen was removed through the   vagina and sent to pathology with the findings as listed above. Fluid from the mass was suctioned as it was removed through the vagina. The vaginal   apex was closed with running 0 V-Loc. The pelvis was copiously irrigated   and hemostasis was noted to be excellent. A 3mm cyst in the posterior culdesac was excised. There was absolutely no active   bleeding. The robotic instruments were removed   under direct vision and the robot was undocked. The pneumoperitoneum was   deflated, and the ports were removed. The skin incisions were closed with   subcuticular 4-0 Monocryl and Dermabond was applied. The patient was then   awakened, extubated, and transported to the recovery room in stable   condition.  All needle, sponge, and instrument counts were noted to be   correct at the end of the case.        ______________________________  SIGNED: Mary Blackwell MD, MD

## 2017-02-28 NOTE — ROUTINE PROCESS
TRANSFER - IN REPORT:    Verbal report received from Lisandra Ching RN(name) on Dusty Silverman  being received from [facility]) for routine post - op      Report consisted of patients Situation, Background, Assessment and   Recommendations(SBAR). Information from the following report(s) Kardex, Procedure Summary and Recent Results was reviewed with the receiving nurse. Opportunity for questions and clarification was provided. Assessment completed upon patients arrival to unit and care assumed.

## 2017-02-28 NOTE — ROUTINE PROCESS
RN performed assessment. Vitals taken. /58, O2% varying between 80-85. Paged provider. 0 talked provider on telephone, obtained orders for O2 nasal canula, insulin, and POC blood glucose monitoring.   1545 pt O2 82%, bp 131/60, 3liters oxygen nasal canula applied  1554 pt O2 96%  1600 paged provider for order clarification

## 2017-02-28 NOTE — INTERVAL H&P NOTE
H&P Update:  Bianka Paez was seen and examined. History and physical has been reviewed. Significant clinical changes have occurred as noted:   normal. CT shows no metastatic disease. Endometrial biopsy negative.      Signed By: Varinder Ruiz MD     February 28, 2017 10:39 AM

## 2017-02-28 NOTE — PROGRESS NOTES
Please page the Oncology pager with questions:  Day Pager (0530 AM to [phone] PM): [phone]  Night Pager ([phone] PM to 0530 AM): [phone]    **THURSDAY MORNING between 8AM and 12PM: please call Fany PAINTING) [phone]**    If no response, please page Dr. Yuly Meeks at [phone] then Dr. Layla Munoz [phone]. If still no response, please call the [facility] L&D Resident Room @ [phone]. Please page [facility] physicians (Dr. Nithin Cyr, or [facility]) only in an emergency. Thank you!

## 2017-02-28 NOTE — PERIOP NOTES
1329: Patient arrived to PACU Jose G Torres RN assigned as Nurse, Report received from Anesthesia & OR Nurse, (Procedure, I &O, Pain Meds & Vitals)  Pt connected to monitor, Post Op pain & nursing assessment complete, will continue to monitor. Oral airway remains intact,     1340: oral airway removed    1335: Updated pt  on phone    [phone]: TRANSFER - OUT REPORT:    Verbal report given to Davy Armijo RN(name) on [employer]  being transferred to Mother/Baby room 3403(unit) for routine post - op       Report consisted of patients Situation, Background, Assessment and   Recommendations(SBAR). Information from the following report(s) SBAR, Kardex, OR Summary, Procedure Summary, MAR and Cardiac Rhythm SR was reviewed with the receiving nurse. Lines:   Peripheral IV 02/28/17 Right Forearm (Active)   Site Assessment Clean, dry, & intact 2/28/2017  1:37 PM   Phlebitis Assessment 0 2/28/2017  1:37 PM   Infiltration Assessment 0 2/28/2017  1:37 PM   Dressing Status Clean, dry, & intact 2/28/2017  1:37 PM   Dressing Type Transparent;Tape 2/28/2017  1:37 PM   Hub Color/Line Status Pink; Infusing;Flushed;Patent 2/28/2017  1:37 PM        Opportunity for questions and clarification was provided.       Patient transported with:   Tech     Visit Vitals    /66    Pulse 79    Temp 98 °F (36.7 °C)    Resp 23    Ht 5' 4\" (1.626 m)    Wt 73.5 kg (162 lb)    SpO2 100%    BMI 27.81 kg/m2       Visit Vitals    /67    Pulse 77    Temp 98 °F (36.7 °C)    Resp 16    Ht 5' 4\" (1.626 m)    Wt 73.5 kg (162 lb)    SpO2 95%    BMI 27.81 kg/m2

## 2017-02-28 NOTE — ANESTHESIA PREPROCEDURE EVALUATION
Anesthetic History               Review of Systems / Medical History  Patient summary reviewed and pertinent labs reviewed    Pulmonary  Within defined limits                 Neuro/Psych   Within defined limits           Cardiovascular    Hypertension              Exercise tolerance: >4 METS     GI/Hepatic/Renal                Endo/Other    Diabetes         Other Findings   Comments: Current Smoker? NO       Elective Surgery? Yes       Abstained from smoking 24 hours prior to anesthesia? YES    Risk Factors for Postoperative nausea/vomiting:       History of postoperative nausea/vomiting? NO       Female? YES       Motion sickness? NO       Intended opioid administration for postoperative analgesia?   NO           Physical Exam    Airway  Mallampati: III  TM Distance: 4 - 6 cm  Neck ROM: normal range of motion   Mouth opening: Normal     Cardiovascular    Rhythm: regular  Rate: normal         Dental      Comments: Missing some upper teeth   Pulmonary  Breath sounds clear to auscultation               Abdominal  GI exam deferred       Other Findings            Anesthetic Plan    ASA: 3  Anesthesia type: general          Induction: Intravenous  Anesthetic plan and risks discussed with: Patient

## 2017-02-28 NOTE — IP AVS SNAPSHOT
Current Discharge Medication List  
  
Take these medications at their scheduled times Dose & Instructions Dispensing Information Comments Morning Noon Evening Bedtime  
 aspirin 81 mg chewable tablet Your next dose is: Today, Tomorrow Other:  ____________ Dose:  81 mg Take 81 mg by mouth daily. Refills:  0  
     
   
   
   
  
 glimepiride 1 mg tablet Commonly known as:  AMARYL Your next dose is: Today, Tomorrow Other:  ____________ Take  by mouth Daily (before breakfast). Refills:  0  
     
   
   
   
  
 * GLUCOPHAGE 500 mg tablet Generic drug:  metFORMIN Your next dose is: Today, Tomorrow Other:  ____________ Dose:  500 mg Take 500 mg by mouth two (2) times daily (with meals). Refills:  0  
     
   
   
   
  
 lisinopril-hydroCHLOROthiazide 20-12.5 mg per tablet Commonly known as:  Cassandra Lush Your next dose is: Today, Tomorrow Other:  ____________ Take  by mouth daily. Refills:  0  
     
   
   
   
  
 metoprolol tartrate 50 mg tablet Commonly known as:  LOPRESSOR Your next dose is: Today, Tomorrow Other:  ____________ Take  by mouth two (2) times a day. Refills:  0 NORVASC 5 mg tablet Generic drug:  amLODIPine Your next dose is: Today, Tomorrow Other:  ____________ Dose:  5 mg Take 5 mg by mouth daily. Refills:  0  
     
   
   
   
  
 simvastatin 40 mg tablet Commonly known as:  ZOCOR Your next dose is: Today, Tomorrow Other:  ____________ Dose:  40 mg Take 40 mg by mouth nightly. Refills:  0  
     
   
   
   
  
 * Notice: This list has 1 medication(s) that are the same as other medications prescribed for you. Read the directions carefully, and ask your doctor or other care provider to review them with you. Take these medications as needed Dose & Instructions Dispensing Information Comments Morning Noon Evening Bedtime * metFORMIN 500 mg tablet Commonly known as:  GLUCOPHAGE Your next dose is: Today, Tomorrow Other:  ____________ Dose:  1500 mg Take 1,500 mg by mouth nightly as needed. Refills:  0  
     
   
   
   
  
 oxyCODONE-acetaminophen 5-325 mg per tablet Commonly known as:  PERCOCET Your next dose is: Today, Tomorrow Other:  ____________ Dose:  1 Tab Take 1 Tab by mouth every four (4) hours as needed for Pain. Max Daily Amount: 6 Tabs. Quantity:  30 Tab Refills:  0  
     
   
   
   
  
 * Notice: This list has 1 medication(s) that are the same as other medications prescribed for you. Read the directions carefully, and ask your doctor or other care provider to review them with you. Where to Get Your Medications Information about where to get these medications is not yet available ! Ask your nurse or doctor about these medications  
  oxyCODONE-acetaminophen 5-325 mg per tablet

## 2017-02-28 NOTE — IP AVS SNAPSHOT
Farzaneh Stephens 
 
 
 [address] [place] [address] Patient: Nathalie Waller MRN: [id number] OXF:8/[id number]/[id number] You are allergic to the following No active allergies Recent Documentation Height  
  
  
  
  
  
 1.626 m Emergency Contacts Name Discharge Info Relation Home Work Mobile 4 Calli Sanchez CAREGIVER [3] Spouse [3] [phone] About your hospitalization You were admitted on:  February 28, 2017 You last received care in the:  [place] [address] You were discharged on:  March 1, 2017 Unit phone number:  [phone] Why you were hospitalized Your primary diagnosis was:  Not on File Your diagnoses also included:  Ovarian Cyst  
  
  
 
  
  
Providers Seen During Your Hospitalizations Provider Role Specialty Primary office phone Samia Nicholas MD Attending Provider Gynecologic Oncology [phone] Your Primary Care Physician (PCP) Primary Care Physician Office Phone Office Fax [place] [phone] Follow-up Information Follow up With Details Comments Contact Info Prince Pelaez, [address] [facility] [address] 
[phone] In 2 weeks Current Discharge Medication List  
  
START taking these medications Dose & Instructions Dispensing Information Comments Morning Noon Evening Bedtime  
 oxyCODONE-acetaminophen 5-325 mg per tablet Commonly known as:  PERCOCET Your next dose is: Today, Tomorrow Other:  _________ Dose:  1 Tab Take 1 Tab by mouth every four (4) hours as needed for Pain. Max Daily Amount: 6 Tabs. Quantity:  30 Tab Refills:  0 CONTINUE these medications which have NOT CHANGED Dose & Instructions Dispensing Information Comments Morning Noon Evening Bedtime aspirin 81 mg chewable tablet Your next dose is: Today, Tomorrow Other:  _________ Dose:  81 mg Take 81 mg by mouth daily. Refills:  0  
     
   
   
   
  
 glimepiride 1 mg tablet Commonly known as:  AMARYL Your next dose is: Today, Tomorrow Other:  _________ Take  by mouth Daily (before breakfast). Refills:  0  
     
   
   
   
  
 lisinopril-hydroCHLOROthiazide 20-12.5 mg per tablet Commonly known as:  Cedric Moy Your next dose is: Today, Tomorrow Other:  _________ Take  by mouth daily. Refills:  0  
     
   
   
   
  
 * metFORMIN 500 mg tablet Commonly known as:  GLUCOPHAGE Your next dose is: Today, Tomorrow Other:  _________ Dose:  1500 mg Take 1,500 mg by mouth nightly as needed. Refills:  0  
     
   
   
   
  
 * GLUCOPHAGE 500 mg tablet Generic drug:  metFORMIN Your next dose is: Today, Tomorrow Other:  _________ Dose:  500 mg Take 500 mg by mouth two (2) times daily (with meals). Refills:  0  
     
   
   
   
  
 metoprolol tartrate 50 mg tablet Commonly known as:  LOPRESSOR Your next dose is: Today, Tomorrow Other:  _________ Take  by mouth two (2) times a day. Refills:  0 NORVASC 5 mg tablet Generic drug:  amLODIPine Your next dose is: Today, Tomorrow Other:  _________ Dose:  5 mg Take 5 mg by mouth daily. Refills:  0  
     
   
   
   
  
 simvastatin 40 mg tablet Commonly known as:  ZOCOR Your next dose is: Today, Tomorrow Other:  _________ Dose:  40 mg Take 40 mg by mouth nightly. Refills:  0  
     
   
   
   
  
 * Notice: This list has 2 medication(s) that are the same as other medications prescribed for you. Read the directions carefully, and ask your doctor or other care provider to review them with you. Where to Get Your Medications Information on where to get these meds will be given to you by the nurse or doctor. ! Ask your nurse or doctor about these medications  
  oxyCODONE-acetaminophen 5-325 mg per tablet Discharge Instructions None Discharge Instructions Attachments/References HTN (HYPERTENSION): DIURETICS: GENERAL INFO (ENGLISH) LOW SODIUM DIET (ENGLISH) DIABETES DIET GUIDELINES: GENERAL INFO (ENGLISH) LAPAROSCOPIC HYSTERECTOMY: POST-OP (ENGLISH) Discharge Orders None "[employer] Announcement We are excited to announce that we are making your provider's discharge notes available to you in "[employer]. You will see these notes when they are completed and signed by the physician that discharged you from your recent hospital stay. If you have any questions or concerns about any information you see in "[employer] please call the Health Information Department where you were seen or reach out to your Primary Care Provider for more information about your plan of care. Introducing \[facility]\"" & HEALTH SERVICES! Rianna Geronimo introduces "[employer] patient portal. Now you can access parts of your medical record, email your doctor's office, and request medication refills online. 1. In your internet browser, go to [email or web address] 2. Click on the First Time User? Click Here link in the Sign In box. You will see the New Member Sign Up page. 3. Enter your "[facility] [facility] Access Code exactly as it appears below. You will not need to use this code after youve completed the sign-up process. If you do not sign up before the expiration date, you must request a new code. · "[facility] [facility] Access Code: [email or web address] Expires: 5/21/2017  9:36 AM 
 
4. Enter the last four digits of your Social Security Number (xxxx) and Date of Birth (mm/dd/yyyy) as indicated and click Submit. You will be taken to the next sign-up page. 5. Create a [employer] ID. This will be your [employer] login ID and cannot be changed, so think of one that is secure and easy to remember. 6. Create a [employer] password. You can change your password at any time. 7. Enter your Password Reset Question and Answer. This can be used at a later time if you forget your password. 8. Enter your e-mail address. You will receive e-mail notification when new information is available in [address] 9. Click Sign Up. You can now view and download portions of your medical record. 10. Click the Download Summary menu link to download a portable copy of your medical information. If you have questions, please visit the Frequently Asked Questions section of the [employer] website. Remember, [employer] is NOT to be used for urgent needs. For medical emergencies, dial 911. Now available from your iPhone and Android! General Information Please provide this summary of care documentation to your next provider. Patient Signature:  ____________________________________________________________ Date:  ____________________________________________________________  
  
Rhea Gotti Provider Signature:  ____________________________________________________________ Date:  ____________________________________________________________ More Information Learning About Diuretics for High Blood Pressure Introduction Diuretics help to lower blood pressure. This reduces your risk of a heart attack and stroke. It also reduces your risk of kidney disease. Diuretics cause your kidneys to remove sodium and water. They also relax the blood vessel walls. These help lower your blood pressure. Examples · Chlorthalidone · Hydrochlorothiazide Possible side effects There are some common side effects. They are: · Too little potassium. · Feeling dizzy. · Rash. · Urinating a lot. · High blood sugar. (But this is not common.) You may have other side effects. Check the information that comes with your medicine. What to know about taking this medicine · You may take other medicines for blood pressure. Diuretics can help those work better. They can also prevent extra fluid in your body. · You may need to take potassium pills. Or you may have to watch how much potassium is in your food. Ask your doctor about this. · You may need blood tests to check your kidneys and your potassium level. · Take your medicines exactly as prescribed. Call your doctor if you think you are having a problem with your medicine. · Check with your doctor or pharmacist before you use any other medicines. This includes over-the-counter medicines. Make sure your doctor knows all of the medicines, vitamins, herbal products, and supplements you take. Taking some medicines together can cause problems. Where can you learn more? Go to http://ed-maggie.info/. Enter N055 in the search box to learn more about \"Learning About Diuretics for High Blood Pressure. \" Current as of: January 27, 2016 Content Version: 11.1 © 5105-9290 [employer]. Care instructions adapted under license by [employer] (which disclaims liability or warranty for this information). If you have questions about a medical condition or this instruction, always ask your healthcare professional. [address] any warranty or liability for your use of this information. Low Sodium Diet (2,000 Milligram): Care Instructions Your Care Instructions Too much sodium causes your body to hold on to extra water. This can raise your blood pressure and force your heart and kidneys to work harder. In very serious cases, this could cause you to be put in the hospital. It might even be life-threatening. By limiting sodium, you will feel better and lower your risk of serious problems. The most common source of sodium is salt. People get most of the salt in their diet from canned, prepared, and packaged foods. Fast food and restaurant meals also are very high in sodium. Your doctor will probably limit your sodium to less than 2,000 milligrams (mg) a day. This limit counts all the sodium in prepared and packaged foods and any salt you add to your food. Follow-up care is a key part of your treatment and safety. Be sure to make and go to all appointments, and call your doctor if you are having problems. It's also a good idea to know your test results and keep a list of the medicines you take. How can you care for yourself at home? Read food labels · Read labels on cans and food packages. The labels tell you how much sodium is in each serving. Make sure that you look at the serving size. If you eat more than the serving size, you have eaten more sodium. · Food labels also tell you the Percent Daily Value for sodium. Choose products with low Percent Daily Values for sodium. · Be aware that sodium can come in forms other than salt, including monosodium glutamate (MSG), sodium citrate, and sodium bicarbonate (baking soda). MSG is often added to Asian food. When you eat out, you can sometimes ask for food without MSG or added salt. Buy low-sodium foods · Buy foods that are labeled \"unsalted\" (no salt added), \"sodium-free\" (less than 5 mg of sodium per serving), or \"low-sodium\" (less than 140 mg of sodium per serving). Foods labeled \"reduced-sodium\" and \"light sodium\" may still have too much sodium. Be sure to read the label to see how much sodium you are getting. · Buy fresh vegetables, or frozen vegetables without added sauces. Buy low-sodium versions of canned vegetables, soups, and other canned goods. Prepare low-sodium meals · Cut back on the amount of salt you use in cooking. This will help you adjust to the taste. Do not add salt after cooking.  One teaspoon of salt has about 2,300 mg of sodium. · Take the salt shaker off the table. · Flavor your food with garlic, lemon juice, onion, vinegar, herbs, and spices. Do not use soy sauce, lite soy sauce, steak sauce, onion salt, garlic salt, celery salt, mustard, or ketchup on your food. · Use low-sodium salad dressings, sauces, and ketchup. Or make your own salad dressings and sauces without adding salt. · Use less salt (or none) when recipes call for it. You can often use half the salt a recipe calls for without losing flavor. Other foods such as rice, pasta, and grains do not need added salt. · Rinse canned vegetables, and cook them in fresh water. This removes somebut not allof the salt. · Avoid water that is naturally high in sodium or that has been treated with water softeners, which add sodium. Call your local water company to find out the sodium content of your water supply. If you buy bottled water, read the label and choose a sodium-free brand. Avoid high-sodium foods · Avoid eating: ¨ Smoked, cured, salted, and canned meat, fish, and poultry. ¨ Ham, hagen, hot dogs, and luncheon meats. ¨ Regular, hard, and processed cheese and regular peanut butter. ¨ Crackers with salted tops, and other salted snack foods such as pretzels, chips, and salted popcorn. ¨ Frozen prepared meals, unless labeled low-sodium. ¨ Canned and dried soups, broths, and bouillon, unless labeled sodium-free or low-sodium. ¨ Canned vegetables, unless labeled sodium-free or low-sodium. ¨ Western Roberta fries, pizza, tacos, and other fast foods. ¨ Pickles, olives, ketchup, and other condiments, especially soy sauce, unless labeled sodium-free or low-sodium. Where can you learn more? Go to http://ed-maggie.info/. Enter M436 in the search box to learn more about \"Low Sodium Diet (2,000 Milligram): Care Instructions. \" Current as of: July 26, 2016 Content Version: 11.1 © 1328-4032 Healthwise[employer] Incorporated. Care instructions adapted under license by [employer] (which disclaims liability or warranty for this information). If you have questions about a medical condition or this instruction, always ask your healthcare professional. [address] any warranty or liability for your use of this information. Learning About Diabetes Food Guidelines Your Care Instructions Meal planning is important to manage diabetes. It helps keep your blood sugar at a target level (which you set with your doctor). You don't have to eat special foods. You can eat what your family eats, including sweets once in a while. But you do have to pay attention to how often you eat and how much you eat of certain foods. You may want to work with a dietitian or a certified diabetes educator (CDE) to help you plan meals and snacks. A dietitian or CDE can also help you lose weight if that is one of your goals. What should you know about eating carbs? Managing the amount of carbohydrate (carbs) you eat is an important part of healthy meals when you have diabetes. Carbohydrate is found in many foods. · Learn which foods have carbs. And learn the amounts of carbs in different foods. ¨ Bread, cereal, pasta, and rice have about 15 grams of carbs in a serving. A serving is 1 slice of bread (1 ounce), ½ cup of cooked cereal, or 1/3 cup of cooked pasta or rice. ¨ Fruits have 15 grams of carbs in a serving. A serving is 1 small fresh fruit, such as an apple or orange; ½ of a banana; ½ cup of cooked or canned fruit; ½ cup of fruit juice; 1 cup of melon or raspberries; or 2 tablespoons of dried fruit. ¨ Milk and no-sugar-added yogurt have 15 grams of carbs in a serving. A serving is 1 cup of milk or 2/3 cup of no-sugar-added yogurt. ¨ Starchy vegetables have 15 grams of carbs in a serving.  A serving is ½ cup of mashed potatoes or sweet potato; 1 cup winter squash; ½ of a small baked potato; ½ cup of cooked beans; or ½ cup cooked corn or green peas. · Learn how much carbs to eat each day and at each meal. A dietitian or CDE can teach you how to keep track of the amount of carbs you eat. This is called carbohydrate counting. · If you are not sure how to count carbohydrate grams, use the Plate Method to plan meals. It is a good, quick way to make sure that you have a balanced meal. It also helps you spread carbs throughout the day. ¨ Divide your plate by types of foods. Put non-starchy vegetables on half the plate, meat or other protein food on one-quarter of the plate, and a grain or starchy vegetable in the final quarter of the plate. To this you can add a small piece of fruit and 1 cup of milk or yogurt, depending on how many carbs you are supposed to eat at a meal. 
· Try to eat about the same amount of carbs at each meal. Do not \"save up\" your daily allowance of carbs to eat at one meal. 
· Proteins have very little or no carbs per serving. Examples of proteins are beef, chicken, turkey, fish, eggs, tofu, cheese, cottage cheese, and peanut butter. A serving size of meat is 3 ounces, which is about the size of a deck of cards. Examples of meat substitute serving sizes (equal to 1 ounce of meat) are 1/4 cup of cottage cheese, 1 egg, 1 tablespoon of peanut butter, and ½ cup of tofu. How can you eat out and still eat healthy? · Learn to estimate the serving sizes of foods that have carbohydrate. If you measure food at home, it will be easier to estimate the amount in a serving of restaurant food. · If the meal you order has too much carbohydrate (such as potatoes, corn, or baked beans), ask to have a low-carbohydrate food instead. Ask for a salad or green vegetables. · If you use insulin, check your blood sugar before and after eating out to help you plan how much to eat in the future. · If you eat more carbohydrate at a meal than you had planned, take a walk or do other exercise. This will help lower your blood sugar. What else should you know? · Limit saturated fat, such as the fat from meat and dairy products. This is a healthy choice because people who have diabetes are at higher risk of heart disease. So choose lean cuts of meat and nonfat or low-fat dairy products. Use olive or canola oil instead of butter or shortening when cooking. · Don't skip meals. Your blood sugar may drop too low if you skip meals and take insulin or certain medicines for diabetes. · Check with your doctor before you drink alcohol. Alcohol can cause your blood sugar to drop too low. Alcohol can also cause a bad reaction if you take certain diabetes medicines. Follow-up care is a key part of your treatment and safety. Be sure to make and go to all appointments, and call your doctor if you are having problems. It's also a good idea to know your test results and keep a list of the medicines you take. Where can you learn more? Go to http://ed-maggie.info/. Enter S303 in the search box to learn more about \"Learning About Diabetes Food Guidelines. \" Current as of: May 23, 2016 Content Version: 11.1 © 3952-9039 [employer]. Care instructions adapted under license by [employer] (which disclaims liability or warranty for this information). If you have questions about a medical condition or this instruction, always ask your healthcare professional. [place] [address] any warranty or liability for your use of this information. Laparoscopic Hysterectomy: What to Expect at [facility] Your Recovery A hysterectomy is surgery to take out the uterus. In some cases, the ovaries and fallopian tubes also are taken out at the same time.  
You can expect to feel better and stronger each day, but you may need pain medicine for a week or two. You may get tired easily or have less energy than usual. The tiredness may last for several weeks after surgery. You will probably notice that your belly is swollen and puffy. This is common. The swelling will take several weeks to go down. You may take about 4 to 6 weeks to fully recover. It is important to avoid lifting while you are recovering so that you can heal. 
This care sheet gives you a general idea about how long it will take for you to recover. But each person recovers at a different pace. Follow the steps below to get better as quickly as possible. How can you care for yourself at home? Activity · Rest when you feel tired. · Be active. Walking is a good choice. · Allow the area to heal. Don't move quickly or lift anything heavy until you are feeling better. · You may shower 24 to 48 hours after surgery, if your doctor okays it. Pat the incision dry. Do not take a bath for the first 2 weeks, or until your doctor tells you it is okay. · Ask your doctor when it is okay for you to have sex. Diet · You can eat your normal diet. If your stomach is upset, try bland, low-fat foods like plain rice, broiled chicken, toast, and yogurt. · If your bowel movements are not regular right after surgery, try to avoid constipation and straining. Drink plenty of water. Your doctor may suggest fiber, a stool softener, or a mild laxative. Medicines · Your doctor will tell you if and when you can restart your medicines. He or she will also give you instructions about taking any new medicines. · If you take blood thinners, such as warfarin (Coumadin), clopidogrel (Plavix), or aspirin, be sure to talk to your doctor. He or she will tell you if and when to start taking those medicines again. Make sure that you understand exactly what your doctor wants you to do. · Be safe with medicines. Read and follow all instructions on the label. ¨ If the doctor gave you a prescription medicine for pain, take it as prescribed. ¨ If you are not taking a prescription pain medicine, ask your doctor if you can take an over-the-counter medicine. · If your doctor prescribed antibiotics, take them as directed. Do not stop taking them just because you feel better. You need to take the full course of antibiotics. Incision care · You may have stitches over the cuts (incisions) the doctor made in your belly. · If you have strips of tape on the cut (incision) the doctor made, leave the tape on for a week or until it falls off. · Wash the area daily with warm, soapy water, and pat it dry. Don't use hydrogen peroxide or alcohol. They can slow healing. · You may cover the area with a gauze bandage if it oozes fluid or rubs against clothing. · Change the bandage every day. Other instructions · You may have some light vaginal bleeding. Wear sanitary pads if needed. Do not douche or use tampons. · Don't have sex until the doctor says it is okay. Follow-up care is a key part of your treatment and safety. Be sure to make and go to all appointments, and call your doctor if you are having problems. It's also a good idea to know your test results and keep a list of the medicines you take. When should you call for help? Call 911 anytime you think you may need emergency care. For example, call if: 
· You passed out (lost consciousness). · You have sudden chest pain and shortness of breath, or you cough up blood. Call your doctor now or seek immediate medical care if: 
· You have severe vaginal bleeding. This means that you are soaking through your usual pads every hour for 2 or more hours. · You have sudden, severe pain in your belly or pelvis. · You have loose stitches, or your incision comes open. · You have signs of infection, such as: 
¨ Increased pain, swelling, warmth, or redness. ¨ Red streaks leading from the incision. ¨ Pus draining from the incision. ¨ Swollen lymph nodes in your neck, armpits, or groin. ¨ A fever. Watch closely for changes in your health, and be sure to contact your doctor if: 
· You do not get better as expected. Where can you learn more? Go to http://ed-maggie.info/. Enter Q131 in the search box to learn more about \"Laparoscopic Hysterectomy: What to Expect at Home. \" Current as of: February 25, 2016 Content Version: 11.1 © 9737-4944 [email or web address]. Care instructions adapted under license by [employer] (which disclaims liability or warranty for this information). If you have questions about a medical condition or this instruction, always ask your healthcare professional. [address] any warranty or liability for your use of this information.

## 2017-03-01 VITALS
OXYGEN SATURATION: 96 % | BODY MASS INDEX: 27.66 KG/M2 | RESPIRATION RATE: 16 BRPM | HEIGHT: 64 IN | WEIGHT: 162 LBS | SYSTOLIC BLOOD PRESSURE: 104 MMHG | DIASTOLIC BLOOD PRESSURE: 54 MMHG | HEART RATE: 88 BPM | TEMPERATURE: 98.1 F

## 2017-03-01 LAB
ANION GAP BLD CALC-SCNC: 7 MMOL/L (ref 3–18)
BUN SERPL-MCNC: 7 MG/DL (ref 7–18)
BUN/CREAT SERPL: 10 (ref 12–20)
CALCIUM SERPL-MCNC: 7.7 MG/DL (ref 8.5–10.1)
CHLORIDE SERPL-SCNC: 104 MMOL/L (ref 100–108)
CO2 SERPL-SCNC: 29 MMOL/L (ref 21–32)
CREAT SERPL-MCNC: 0.72 MG/DL (ref 0.6–1.3)
ERYTHROCYTE [DISTWIDTH] IN BLOOD BY AUTOMATED COUNT: 13.7 % (ref 11.6–14.5)
GLUCOSE BLD STRIP.AUTO-MCNC: 119 MG/DL (ref 70–110)
GLUCOSE BLD STRIP.AUTO-MCNC: 145 MG/DL (ref 70–110)
GLUCOSE SERPL-MCNC: 137 MG/DL (ref 74–99)
HCT VFR BLD AUTO: 32.4 % (ref 35–45)
HGB BLD-MCNC: 10.5 G/DL (ref 12–16)
MCH RBC QN AUTO: 28.2 PG (ref 24–34)
MCHC RBC AUTO-ENTMCNC: 32.4 G/DL (ref 31–37)
MCV RBC AUTO: 87.1 FL (ref 74–97)
PLATELET # BLD AUTO: 157 K/UL (ref 135–420)
PMV BLD AUTO: 9.4 FL (ref 9.2–11.8)
POTASSIUM SERPL-SCNC: 3.5 MMOL/L (ref 3.5–5.5)
RBC # BLD AUTO: 3.72 M/UL (ref 4.2–5.3)
SODIUM SERPL-SCNC: 140 MMOL/L (ref 136–145)
WBC # BLD AUTO: 5.8 K/UL (ref 4.6–13.2)

## 2017-03-01 PROCEDURE — 96375 TX/PRO/DX INJ NEW DRUG ADDON: CPT

## 2017-03-01 PROCEDURE — 36415 COLL VENOUS BLD VENIPUNCTURE: CPT | Performed by: OBSTETRICS & GYNECOLOGY

## 2017-03-01 PROCEDURE — 74011250636 HC RX REV CODE- 250/636: Performed by: OBSTETRICS & GYNECOLOGY

## 2017-03-01 PROCEDURE — 80048 BASIC METABOLIC PNL TOTAL CA: CPT | Performed by: OBSTETRICS & GYNECOLOGY

## 2017-03-01 PROCEDURE — 85027 COMPLETE CBC AUTOMATED: CPT | Performed by: OBSTETRICS & GYNECOLOGY

## 2017-03-01 PROCEDURE — 82962 GLUCOSE BLOOD TEST: CPT

## 2017-03-01 PROCEDURE — 74011250637 HC RX REV CODE- 250/637: Performed by: OBSTETRICS & GYNECOLOGY

## 2017-03-01 PROCEDURE — 74011000250 HC RX REV CODE- 250: Performed by: OBSTETRICS & GYNECOLOGY

## 2017-03-01 RX ADMIN — FAMOTIDINE 20 MG: 10 INJECTION, SOLUTION INTRAVENOUS at 09:00

## 2017-03-01 RX ADMIN — GLIMEPIRIDE 1 MG: 2 TABLET ORAL at 07:30

## 2017-03-01 RX ADMIN — METFORMIN HYDROCHLORIDE 500 MG: 500 TABLET, FILM COATED ORAL at 08:00

## 2017-03-01 RX ADMIN — LISINOPRIL 20 MG: 20 TABLET ORAL at 08:45

## 2017-03-01 RX ADMIN — HYDROCHLOROTHIAZIDE 12.5 MG: 12.5 CAPSULE ORAL at 08:45

## 2017-03-01 RX ADMIN — OXYCODONE HYDROCHLORIDE AND ACETAMINOPHEN 2 TABLET: 5; 325 TABLET ORAL at 10:04

## 2017-03-01 RX ADMIN — OXYCODONE HYDROCHLORIDE AND ACETAMINOPHEN 1 TABLET: 5; 325 TABLET ORAL at 04:45

## 2017-03-01 RX ADMIN — METOPROLOL TARTRATE 50 MG: 50 TABLET ORAL at 08:50

## 2017-03-01 RX ADMIN — SODIUM CHLORIDE AND POTASSIUM CHLORIDE: 4.5; 1.49 INJECTION, SOLUTION INTRAVENOUS at 00:32

## 2017-03-01 RX ADMIN — AMLODIPINE BESYLATE 5 MG: 2.5 TABLET ORAL at 08:49

## 2017-03-01 NOTE — PROGRESS NOTES
Pt brushed her teeth; up in chair now. No difficulty breathing or SOB noted. 98% on RA. NC off. Pt comfortable with pillow for splinting. SCD's off. Pt anxiously awaiting breakfast. Bedside table and call bell within pt's reach. Daughter continued at bedside. Report to given soon.

## 2017-03-01 NOTE — DISCHARGE SUMMARY
Discharge Summary     Patient ID:  Reji Mendoza  [id number]  73 y.o.  1950    Admit Date: 2/28/2017    Discharge Date: 3/4/2017    Admission Diagnoses: pelvic mass postmenipausal bleeding  pelvic mass postmenipausal bleeding    Discharge Diagnoses:    Problem List as of 3/1/2017  Date Reviewed: 2/28/2017          Codes Class Noted - Resolved    Ovarian cyst ICD-10-CM: N83.209  ICD-9-CM: 620.2  2/28/2017 - Present               Admission Condition: Good    Discharge Condition: Good    Last Procedure: Procedure(s):  davinci total laparoscopic HYSTERECTOMY, bilateral salpingo oophorectomy, posible staging XI ROBOTIC ASSISTED      Hospital Course:   Normal hospital course for this procedure. Consults: None        Disposition: home    Patient Instructions:   Cannot display discharge medications since this patient is not currently admitted. Activity: No heavy lifting for 6 weeks, pelvic rest   Diet: Regular Diet  Wound Care: Keep wound clean and dry    Follow-up with Angelo Nevarez MD as previously scheduled. .      Denver Jimenez MD  3/4/2017  10:06 AM

## 2017-03-01 NOTE — PROGRESS NOTES
Assisted to the BR, Voided freely without problem. No c/o dizziness. IV discontinued. Pt discharge teaching reinforced, pt has no questions at this time. Incision clean, dry, intact. Vitals signs stable. ID bands removed and shredded at pt request. Pt discharged home with belongings via wheelchair to car.

## 2017-03-01 NOTE — PROGRESS NOTES
Gynecologic Oncology Progress Note    Patient: Dusty Silverman MRN: [id number]  SSN: [id number]    YOB: 1950  Age: 73 y.o. Sex: female      Admit Date: 2/28/2017    LOS: 0 days     Assessment/Plan:     73 y.o. 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 Labs reviewed, VSS.     - Pain adequately controlled with percocet. Continue PRN. - Tolerating regular diet. - Kumar out, due to void. - Post-operative labs reviewed, appropriate.  - DVT ppx: Lovenox and SCDs, continue. - Encourage OOB to chair and ambulation in hallway prior to discharge. - Continue routine post-op care. Disposition:  Patient is stable for discharge home once post-operative goals are met. Post-op restrictions reviewed with patient. Follow-up appointment previously scheduled for 2 weeks from surgery date. Home Rx on chart. Subjective:     Feels quite well. Adequate pain control, tolerating regular diet. Due to void. Objective:     Vitals:    03/01/17 0745 03/01/17 0845 03/01/17 0849 03/01/17 0850   BP: 109/50 104/54 104/54 104/54   Pulse: 82 88 88 88   Resp: 16      Temp: 98.1 °F (36.7 °C)      SpO2: 96%      Weight:       Height:            Intake and Output:  Current Shift:    Last three shifts: 02/27 1901 - 03/01 0700  In: 2270 [I.V.:2270]  Out: 2050 [Urine:2050]    Physical Exam:   Constitutional: NAD, Alert. Cardiovascular: RRR   Respiratory: CTAB   Abdomen: Soft, mildly distended, non tender. Laparoscopic incision sites C/D/I with dermabond in place. Active bowel sounds. Extremities: No edema, warm.      Lab/Data Review:  Recent Results (from the past 24 hour(s))   GLUCOSE, POC    Collection Time: 02/28/17  1:40 PM   Result Value Ref Range    Glucose (POC) 151 (H) 70 - 110 mg/dL   GLUCOSE, POC    Collection Time: 02/28/17  4:35 PM   Result Value Ref Range    Glucose (POC) 137 (H) 70 - 110 mg/dL   CBC WITH AUTOMATED DIFF Collection Time: 02/28/17  6:13 PM   Result Value Ref Range    WBC 6.0 4.6 - 13.2 K/uL    RBC 3.77 (L) 4.20 - 5.30 M/uL    HGB 10.7 (L) 12.0 - 16.0 g/dL    HCT 32.8 (L) 35.0 - 45.0 %    MCV 87.0 74.0 - 97.0 FL    MCH 28.4 24.0 - 34.0 PG    MCHC 32.6 31.0 - 37.0 g/dL    RDW 13.7 11.6 - 14.5 %    PLATELET 632 381 - 115 K/uL    MPV 9.6 9.2 - 11.8 FL    NEUTROPHILS 76 (H) 40 - 73 %    LYMPHOCYTES 17 (L) 21 - 52 %    MONOCYTES 7 3 - 10 %    EOSINOPHILS 0 0 - 5 %    BASOPHILS 0 0 - 2 %    ABS. NEUTROPHILS 4.6 1.8 - 8.0 K/UL    ABS. LYMPHOCYTES 1.0 0.9 - 3.6 K/UL    ABS. MONOCYTES 0.4 0.05 - 1.2 K/UL    ABS. EOSINOPHILS 0.0 0.0 - 0.4 K/UL    ABS. BASOPHILS 0.0 0.0 - 0.06 K/UL    DF AUTOMATED     GLUCOSE, POC    Collection Time: 03/01/17 12:28 AM   Result Value Ref Range    Glucose (POC) 119 (H) 70 - 790 mg/dL   METABOLIC PANEL, BASIC    Collection Time: 03/01/17  6:15 AM   Result Value Ref Range    Sodium 140 136 - 145 mmol/L    Potassium 3.5 3.5 - 5.5 mmol/L    Chloride 104 100 - 108 mmol/L    CO2 29 21 - 32 mmol/L    Anion gap 7 3.0 - 18 mmol/L    Glucose 137 (H) 74 - 99 mg/dL    BUN 7 7.0 - 18 MG/DL    Creatinine 0.72 0.6 - 1.3 MG/DL    BUN/Creatinine ratio 10 (L) 12 - 20      GFR est AA >60 >60 ml/min/1.73m2    GFR est non-AA >60 >60 ml/min/1.73m2    Calcium 7.7 (L) 8.5 - 10.1 MG/DL   CBC W/O DIFF    Collection Time: 03/01/17  6:15 AM   Result Value Ref Range    WBC 5.8 4.6 - 13.2 K/uL    RBC 3.72 (L) 4.20 - 5.30 M/uL    HGB 10.5 (L) 12.0 - 16.0 g/dL    HCT 32.4 (L) 35.0 - 45.0 %    MCV 87.1 74.0 - 97.0 FL    MCH 28.2 24.0 - 34.0 PG    MCHC 32.4 31.0 - 37.0 g/dL    RDW 13.7 11.6 - 14.5 %    PLATELET 968 169 - 938 K/uL    MPV 9.4 9.2 - 11.8 FL   GLUCOSE, POC    Collection Time: 03/01/17  6:40 AM   Result Value Ref Range    Glucose (POC) 145 (H) 70 - 110 mg/dL       Signed By: Dino Bill PA-C  [facility]  [phone], between hours of 7am-5pm, Monday-Friday.  Outside of these hours, please page GYN ONC resident at [phone].     March 1, 2017

## 2017-03-01 NOTE — PROGRESS NOTES
Assumed care of pt. Pt in bed with daughter at bedside. Introduced myself to pt; updated whiteboard; explained nursing plan of care for my shift. Pt verbalized understanding to plan. Pt alert and oriented X3, denies pain/discomfort at this time. Lungs clear b/l; 100% on 3LNC. Will try to ween off O2 tonight. Incentive Spirometer given; tolerated well. Decreased to 2L now. PIV with IVF infusing; no s/s of infiltration. BS hypoactive. Incisions dry and intact. No edema. Herrera in place draining clear urine. Current /61. Assist pt with turning and repositioning in bed. Pt tolerating clear diet. Will remove herrera in am and encourage ambulation.

## 2017-03-01 NOTE — PROGRESS NOTES
Patient resting. Denies pain. SpO2 100% at [facility]. Decreased to [facility]. Patient denies diffculty breathing. Blood glucose level tested per MD orders  119mg/dL.

## 2017-03-01 NOTE — PROGRESS NOTES
Kumar out. 1700 ml of urine emptied. Pt due to void by 1000. Will assist on side of bed and from bed to chair @ 0630.

## 2017-03-01 NOTE — ROUTINE PROCESS
Bedside and Verbal shift change report given to Cass Nicole RN (oncoming nurse) by [place]. Marilin Montanez RN (offgoing nurse). Report included the following information SBAR, Kardex, OR Summary, Procedure Summary, Intake/Output, MAR, Recent Results and Med Rec Status. Assumed care of pt. Introduced myself and updated whiteboard, explained nursing plan of care for my shift. Pt alert and oriented; assessment and vitals completed, WNL. Pt denies headache, visual disturbances and epigastric pain. Pt no c/o pain . Pt verbalized agreement to plan. Questions answered.

## 2017-03-01 NOTE — PROGRESS NOTES
Gynecologic Oncology Progress Note    Patient: Sebastien Barrera MRN: [id number]  SSN: [id number]    YOB: 1950  Age: 73 y.o. Sex: female      Admit Date: 2/28/2017    LOS: 0 days     Assessment/Plan:     73 y.o. F admitted POD1 s/p Robotic hysterectomy with bilateral salpingo-oophorectomy (BSO). Frozen specimen consistent with mucinous cystadenoma  VSS, post-operative labs reviewed and are appropriate    - Pain well controlled on percocet   - Currently tolerating clear. Advance diet as tolerated. - Awaiting spontaneously void, herrera is out  - Ambulating without difficulty  - DVT ppx: Lovenox and SCDs, continue. - Encourage OOB to chair for meals and ambulation in hallway as tolerated. - Continue routine post-op care. Disposition: Patient is stable for discharge home. Subjective:     Patient has no complaints this morning. No events overnight. She slept well. Tolerating clears, planning to eat diabetic diet breakfast this morning. Not yet passing gas. Herrera removed, awaiting void. Ambulating without difficulty. Pain is well controlled. Patient feels ready for discharge. Objective:     Vitals:    02/28/17 2126 03/01/17 0033 03/01/17 0300 03/01/17 0447   BP:  113/55     Pulse: 68 (!) 59     Resp:  16     Temp:  98.2 °F (36.8 °C)     SpO2: 99% 100% 100% 100%   Weight:       Height:            Intake and Output:  Current Shift: 02/28 1901 - 03/01 0700  In: 1201.7 [I.V.:1201.7]  Out: 1700 [Urine:1700]  Last three shifts: 02/27 0701 - 02/28 1900  In: 1068.3 [I.V.:1068.3]  Out: 350 [Urine:350]    Physical Exam:   Constitutional: NAD, Alert. Cardiovascular: RRR   Respiratory: CTAB   Abdomen: Soft, non-distended, appropriately TTP, incisions clean/dry/intact    Extremities: No edema, warm, SCDs in place.       Lab/Data Review:  Recent Results (from the past 24 hour(s))   GLUCOSE, POC    Collection Time: 02/28/17  8:32 AM   Result Value Ref Range    Glucose (POC) 149 (H) 70 - 110 mg/dL GLUCOSE, POC    Collection Time: 02/28/17  1:40 PM   Result Value Ref Range    Glucose (POC) 151 (H) 70 - 110 mg/dL   GLUCOSE, POC    Collection Time: 02/28/17  4:35 PM   Result Value Ref Range    Glucose (POC) 137 (H) 70 - 110 mg/dL   CBC WITH AUTOMATED DIFF    Collection Time: 02/28/17  6:13 PM   Result Value Ref Range    WBC 6.0 4.6 - 13.2 K/uL    RBC 3.77 (L) 4.20 - 5.30 M/uL    HGB 10.7 (L) 12.0 - 16.0 g/dL    HCT 32.8 (L) 35.0 - 45.0 %    MCV 87.0 74.0 - 97.0 FL    MCH 28.4 24.0 - 34.0 PG    MCHC 32.6 31.0 - 37.0 g/dL    RDW 13.7 11.6 - 14.5 %    PLATELET 981 870 - 028 K/uL    MPV 9.6 9.2 - 11.8 FL    NEUTROPHILS 76 (H) 40 - 73 %    LYMPHOCYTES 17 (L) 21 - 52 %    MONOCYTES 7 3 - 10 %    EOSINOPHILS 0 0 - 5 %    BASOPHILS 0 0 - 2 %    ABS. NEUTROPHILS 4.6 1.8 - 8.0 K/UL    ABS. LYMPHOCYTES 1.0 0.9 - 3.6 K/UL    ABS. MONOCYTES 0.4 0.05 - 1.2 K/UL    ABS. EOSINOPHILS 0.0 0.0 - 0.4 K/UL    ABS.  BASOPHILS 0.0 0.0 - 0.06 K/UL    DF AUTOMATED     GLUCOSE, POC    Collection Time: 03/01/17 12:28 AM   Result Value Ref Range    Glucose (POC) 119 (H) 70 - 110 mg/dL   GLUCOSE, POC    Collection Time: 03/01/17  6:40 AM   Result Value Ref Range    Glucose (POC) 145 (H) 70 - 110 mg/dL       Medications:   Current Facility-Administered Medications   Medication Dose Route Frequency    lactated ringers infusion  125 mL/hr IntraVENous CONTINUOUS    fentaNYL citrate (PF) injection 50 mcg  50 mcg IntraVENous PRN    HYDROmorphone (DILAUDID) injection 0.5 mg  0.5 mg IntraVENous Multiple    glucose chewable tablet 16 g  4 Tab Oral PRN    glucagon (GLUCAGEN) injection 1 mg  1 mg IntraMUSCular PRN    dextrose (D50W) injection syrg 12.5-25 g  25-50 mL IntraVENous PRN    amLODIPine (NORVASC) tablet 5 mg  5 mg Oral DAILY    glimepiride (AMARYL) tablet 1 mg  1 mg Oral ACB    lisinopril (PRINIVIL, ZESTRIL) tablet 20 mg  20 mg Oral DAILY    metFORMIN (GLUCOPHAGE) tablet 500 mg  500 mg Oral BID WITH MEALS    metoprolol tartrate (LOPRESSOR) tablet 50 mg  50 mg Oral BID    sodium chloride (NS) flush 5-10 mL  5-10 mL IntraVENous Q8H    sodium chloride (NS) flush 5-10 mL  5-10 mL IntraVENous PRN    acetaminophen (TYLENOL) tablet 650 mg  650 mg Oral Q4H PRN    oxyCODONE-acetaminophen (PERCOCET) 5-325 mg per tablet 2 Tab  2 Tab Oral Q4H PRN    HYDROmorphone (PF) (DILAUDID) injection 1 mg  1 mg IntraVENous Q4H PRN    naloxone (NARCAN) injection 0.4 mg  0.4 mg IntraVENous PRN    ondansetron (ZOFRAN) injection 4 mg  4 mg IntraVENous Q4H PRN    diphenhydrAMINE (BENADRYL) capsule 25 mg  25 mg Oral Q4H PRN    zolpidem (AMBIEN) tablet 5 mg  5 mg Oral QHS PRN    enoxaparin (LOVENOX) injection 40 mg  40 mg SubCUTAneous Q24H    0.45% sodium chloride with KCl 20 mEq/L infusion   IntraVENous CONTINUOUS    sodium chloride (NS) flush 5-10 mL  5-10 mL IntraVENous Q8H    sodium chloride (NS) flush 5-10 mL  5-10 mL IntraVENous PRN    diphenhydrAMINE (BENADRYL) injection 25 mg  25 mg IntraVENous Q4H PRN    bisacodyl (DULCOLAX) tablet 5 mg  5 mg Oral DAILY PRN    famotidine (PF) (PEPCID) injection 20 mg  20 mg IntraVENous Q12H    hydroCHLOROthiazide (MICROZIDE) capsule 12.5 mg  12.5 mg Oral DAILY    insulin lispro (HUMALOG) injection   SubCUTAneous TIDAC    dextrose (D50W) injection syrg 12.5-25 g  25-50 mL IntraVENous PRN         Please page the Oncology pager with questions:  Day Pager (0530 AM to [phone] PM): [phone]  Night Pager ([phone] PM to 0530 AM): [phone]    **THURSDAY MORNING between 8AM and 12PM: please call Fany PAINTING) [phone]**    If no response, please page Dr. Dina Hendricks at [phone]   If still no response, please call the [facility]D Resident Room @ [phone]. Please page [facility] physicians (Dr. Stephanie Bardales, or [facility]) only in an emergency. Thank you!        Liv Valdez MD PGY-2  [facility]

## 2018-05-04 ENCOUNTER — HOSPITAL ENCOUNTER (OUTPATIENT)
Dept: ULTRASOUND IMAGING | Age: 68
Discharge: HOME | End: 2018-05-04
Attending: FAMILY MEDICINE
Payer: MEDICARE

## 2018-05-04 ENCOUNTER — HOSPITAL ENCOUNTER (OUTPATIENT)
Dept: BONE DENSITY | Age: 68
Discharge: HOME | End: 2018-05-04
Attending: FAMILY MEDICINE
Payer: MEDICARE

## 2018-05-04 DIAGNOSIS — N95.0 POST-MENOPAUSAL BLEEDING: ICD-10-CM

## 2018-05-04 DIAGNOSIS — E04.9 ENLARGED THYROID: ICD-10-CM

## 2018-05-04 DIAGNOSIS — Z13.820 OSTEOPOROSIS SCREENING: ICD-10-CM

## 2018-05-04 DIAGNOSIS — Z78.0 POSTMENOPAUSAL: ICD-10-CM

## 2018-05-04 DIAGNOSIS — M81.0 OSTEOPOROSIS: ICD-10-CM

## 2018-05-04 DIAGNOSIS — Z00.00 HEALTHCARE MAINTENANCE: ICD-10-CM

## 2018-05-04 PROCEDURE — 76536 US EXAM OF HEAD AND NECK: CPT

## 2018-05-04 PROCEDURE — 77080 DXA BONE DENSITY AXIAL: CPT

## 2019-08-20 ENCOUNTER — HOSPITAL ENCOUNTER (OUTPATIENT)
Dept: MAMMOGRAPHY | Age: 69
Discharge: HOME | End: 2019-08-20
Attending: FAMILY MEDICINE
Payer: MEDICARE

## 2019-08-20 DIAGNOSIS — Z12.31 VISIT FOR SCREENING MAMMOGRAM: ICD-10-CM

## 2019-08-20 PROCEDURE — 77067 SCR MAMMO BI INCL CAD: CPT

## 2020-10-16 ENCOUNTER — TRANSCRIBE ORDER (OUTPATIENT)
Dept: SCHEDULING | Age: 70
End: 2020-10-16

## 2020-10-16 DIAGNOSIS — Z12.31 VISIT FOR SCREENING MAMMOGRAM: Primary | ICD-10-CM

## 2020-10-20 ENCOUNTER — TRANSCRIBE ORDER (OUTPATIENT)
Dept: SCHEDULING | Age: 70
End: 2020-10-20

## 2020-10-20 DIAGNOSIS — Z12.31 VISIT FOR SCREENING MAMMOGRAM: Primary | ICD-10-CM

## (undated) DEVICE — STERILE POLYISOPRENE POWDER-FREE SURGICAL GLOVES: Brand: PROTEXIS

## (undated) DEVICE — COVER LT HNDL BLU PLAS

## (undated) DEVICE — PREP SKN CHLRAPRP 26ML TNT -- CONVERT TO ITEM 373320

## (undated) DEVICE — [HIGH FLOW INSUFFLATOR,  DO NOT USE IF PACKAGE IS DAMAGED,  KEEP DRY,  KEEP AWAY FROM SUNLIGHT,  PROTECT FROM HEAT AND RADIOACTIVE SOURCES.]: Brand: PNEUMOSURE

## (undated) DEVICE — COLUMN DRAPE

## (undated) DEVICE — SUTURE SZ 0 27IN 5/8 CIR UR-6  TAPER PT VIOLET ABSRB VICRYL J603H

## (undated) DEVICE — DECANTER VI C-FLO LF --

## (undated) DEVICE — TRAY CATH 16F URIN MTR LTX -- CONVERT TO ITEM 363111

## (undated) DEVICE — DRAPE,UTILITY,TAPE,15X26,STERILE: Brand: MEDLINE

## (undated) DEVICE — DRAPE SHEET, X-LARGE: Brand: CONVERTORS

## (undated) DEVICE — SOL INJ SOD CL 0.9% 1000ML BG --

## (undated) DEVICE — TELFA NON-ADHERENT ABSORBENT DRESSING: Brand: TELFA

## (undated) DEVICE — LIGHT HANDLE: Brand: DEVON

## (undated) DEVICE — STERILE LATEX POWDER-FREE SURGICAL GLOVESWITH NITRILE COATING: Brand: PROTEXIS

## (undated) DEVICE — ROUND SHAPE BALLOON: Brand: PDB

## (undated) DEVICE — ARM DRAPE

## (undated) DEVICE — PAD BD CONVOLUTED FOAM

## (undated) DEVICE — VISUALIZATION SYSTEM: Brand: CLEARIFY

## (undated) DEVICE — INTENDED FOR TISSUE SEPARATION, AND OTHER PROCEDURES THAT REQUIRE A SHARP SURGICAL BLADE TO PUNCTURE OR CUT.: Brand: BARD-PARKER ® CARBON RIB-BACK BLADES

## (undated) DEVICE — CLIP LIG ABSRB HEM-LOK LG PUR --

## (undated) DEVICE — DRAPE XR C ARM MOB SURG 44X72 --

## (undated) DEVICE — DERMABOND SKIN ADH 0.7ML -- DERMABOND ADVANCED 12/BX

## (undated) DEVICE — SUTURE MCRYL SZ 4-0 L18IN ABSRB UD L19MM PS-2 3/8 CIR PRIM Y496G

## (undated) DEVICE — SUT PROL 0 30IN CT1 BLU --

## (undated) DEVICE — ELECTRO LUBE IS A SINGLE PATIENT USE DEVICE THAT IS INTENDED TO BE USED ON ELECTROSURGICAL ELECTRODES TO REDUCE STICKING.: Brand: KEY SURGICAL ELECTRO LUBE

## (undated) DEVICE — UNIVERSAL FIXATION CANNULA: Brand: VERSAONE

## (undated) DEVICE — CARTRIDGE CLP LIG HEMLOK GRN --

## (undated) DEVICE — SEAL UNIV 5-8MM DISP BX/10 -- DA VINCI XI - SNGL USE

## (undated) DEVICE — TIP COVER ACCESSORY

## (undated) DEVICE — Z DISCONTINUED NO SUB IDED TROCAR LAP DIA8MM STD LEN BLDELSS TAPR TIP THRD N OPT VW

## (undated) DEVICE — SOL IRR NACL 0.9% 500ML POUR --

## (undated) DEVICE — SUTURE V-LOC 180 SZ 0 L12IN ABSRB GRN L37MM GS-21 1/2 CIR VLOCL0316

## (undated) DEVICE — VCARE LARGE, UTERINE MANIPULATOR, VAGINAL-CERVICAL-AHLUWALIA'S-RETRACTOR-ELEVATOR: Brand: VCARE

## (undated) DEVICE — KENDALL SCD EXPRESS SLEEVES, KNEE LENGTH, MEDIUM: Brand: KENDALL SCD

## (undated) DEVICE — Z INACTIVE PER BARD TRAY URO DIA16FR INF CTRL F COMPLT CARE 350ML URIN M

## (undated) DEVICE — DISPOSABLE SUCTION/IRRIGATOR TUBE SET WITH TIP: Brand: AHTO

## (undated) DEVICE — Device

## (undated) DEVICE — BLADELESS OBTURATOR

## (undated) DEVICE — SYRINGE COR CTRL C10ML SLD PLUNG FIX M

## (undated) DEVICE — REM POLYHESIVE ADULT PATIENT RETURN ELECTRODE: Brand: VALLEYLAB